# Patient Record
Sex: FEMALE | Race: WHITE | NOT HISPANIC OR LATINO | Employment: OTHER | URBAN - METROPOLITAN AREA
[De-identification: names, ages, dates, MRNs, and addresses within clinical notes are randomized per-mention and may not be internally consistent; named-entity substitution may affect disease eponyms.]

---

## 2023-09-15 ENCOUNTER — HOSPITAL ENCOUNTER (OUTPATIENT)
Dept: DATA CONVERSION | Facility: HOSPITAL | Age: 73
End: 2023-09-15

## 2023-11-02 ENCOUNTER — APPOINTMENT (OUTPATIENT)
Dept: PAIN MEDICINE | Facility: CLINIC | Age: 73
End: 2023-11-02
Payer: MEDICARE

## 2023-11-17 PROBLEM — H92.12 OTORRHEA, LEFT: Status: ACTIVE | Noted: 2023-11-17

## 2023-11-17 PROBLEM — H92.10 OTORRHEA: Status: ACTIVE | Noted: 2023-11-17

## 2023-11-17 PROBLEM — R42 EPISODIC LIGHTHEADEDNESS: Status: ACTIVE | Noted: 2023-08-22

## 2023-11-17 PROBLEM — M81.0 AGE-RELATED OSTEOPOROSIS WITHOUT CURRENT PATHOLOGICAL FRACTURE: Status: ACTIVE | Noted: 2020-04-29

## 2023-11-17 PROBLEM — L81.9 ABNORMAL PIGMENTATION OF SKIN: Status: ACTIVE | Noted: 2019-06-19

## 2023-11-17 PROBLEM — R53.83 FATIGUE: Status: ACTIVE | Noted: 2023-11-17

## 2023-11-17 PROBLEM — R42 LIGHTHEADEDNESS: Status: ACTIVE | Noted: 2023-11-17

## 2023-11-17 PROBLEM — M51.369 DEGENERATION OF INTERVERTEBRAL DISC OF LUMBAR REGION: Status: ACTIVE | Noted: 2023-11-17

## 2023-11-17 PROBLEM — Z86.39 HISTORY OF METABOLIC DISORDER: Status: ACTIVE | Noted: 2023-11-17

## 2023-11-17 PROBLEM — H61.21 EXCESSIVE CERUMEN IN EAR CANAL, RIGHT: Status: ACTIVE | Noted: 2023-08-22

## 2023-11-17 PROBLEM — D89.40 MAST CELL ACTIVATION, UNSPECIFIED (MULTI): Status: ACTIVE | Noted: 2023-01-01

## 2023-11-17 PROBLEM — H60.62 CHRONIC OTITIS EXTERNA OF LEFT EAR: Status: ACTIVE | Noted: 2023-08-22

## 2023-11-17 PROBLEM — H90.A32 MIXED CONDUCTIVE AND SENSORINEURAL HEARING LOSS, UNILATERAL, LEFT EAR WITH RESTRICTED HEARING ON THE CONTRALATERAL SIDE: Status: ACTIVE | Noted: 2023-08-22

## 2023-11-17 PROBLEM — G44.209 HEADACHE, TENSION-TYPE: Status: ACTIVE | Noted: 2023-11-17

## 2023-11-17 PROBLEM — E78.2 MIXED HYPERLIPIDEMIA: Status: ACTIVE | Noted: 2020-04-29

## 2023-11-17 PROBLEM — H61.22 IMPACTED CERUMEN OF LEFT EAR: Status: ACTIVE | Noted: 2023-11-17

## 2023-11-17 PROBLEM — Z86.79 HISTORY OF HYPERTENSION: Status: ACTIVE | Noted: 2023-11-17

## 2023-11-17 PROBLEM — M51.369 DDD (DEGENERATIVE DISC DISEASE), LUMBAR: Status: ACTIVE | Noted: 2023-11-17

## 2023-11-17 PROBLEM — G89.29 CHRONIC PAIN: Status: ACTIVE | Noted: 2023-11-17

## 2023-11-17 PROBLEM — M51.36 DDD (DEGENERATIVE DISC DISEASE), LUMBAR: Status: ACTIVE | Noted: 2023-11-17

## 2023-11-17 PROBLEM — L82.1 OTHER SEBORRHEIC KERATOSIS: Status: ACTIVE | Noted: 2019-06-19

## 2023-11-17 PROBLEM — M54.51 VERTEBROGENIC LOW BACK PAIN: Status: ACTIVE | Noted: 2023-11-17

## 2023-11-17 PROBLEM — M51.36 DEGENERATION OF INTERVERTEBRAL DISC OF LUMBAR REGION: Status: ACTIVE | Noted: 2023-11-17

## 2023-11-17 PROBLEM — E21.3: Status: ACTIVE | Noted: 2019-03-14

## 2023-11-17 PROBLEM — N20.0 KIDNEY CALCULUS: Status: ACTIVE | Noted: 2023-11-17

## 2023-11-17 PROBLEM — M96.1 FAILED BACK SURGICAL SYNDROME: Status: ACTIVE | Noted: 2023-11-17

## 2023-11-17 PROBLEM — H66.90 CHRONIC OTITIS MEDIA: Status: ACTIVE | Noted: 2023-11-17

## 2023-11-17 PROBLEM — H66.92 CHRONIC OTITIS MEDIA OF LEFT EAR: Status: ACTIVE | Noted: 2023-08-22

## 2023-11-17 PROBLEM — D18.01 HEMANGIOMA OF SKIN AND SUBCUTANEOUS TISSUE: Status: ACTIVE | Noted: 2019-06-19

## 2023-11-17 RX ORDER — IPRATROPIUM BROMIDE 21 UG/1
2 SPRAY, METERED NASAL 2 TIMES DAILY PRN
COMMUNITY
Start: 2023-04-21

## 2023-11-17 RX ORDER — ALENDRONATE SODIUM 70 MG/1
TABLET ORAL
COMMUNITY
Start: 2020-01-09 | End: 2023-11-28 | Stop reason: ALTCHOICE

## 2023-11-17 RX ORDER — FLUTICASONE PROPIONATE 50 MCG
1 SPRAY, SUSPENSION (ML) NASAL DAILY PRN
COMMUNITY

## 2023-11-17 RX ORDER — ACETAMINOPHEN 500 MG
1 TABLET ORAL
COMMUNITY

## 2023-11-17 RX ORDER — FAMOTIDINE 20 MG/1
20 TABLET, FILM COATED ORAL
COMMUNITY
Start: 2022-08-22 | End: 2023-11-28 | Stop reason: ALTCHOICE

## 2023-11-17 RX ORDER — ALBUTEROL SULFATE 90 UG/1
2 AEROSOL, METERED RESPIRATORY (INHALATION) EVERY 4 HOURS PRN
COMMUNITY
Start: 2018-11-14

## 2023-11-17 RX ORDER — CHOLECALCIFEROL (VITAMIN D3) 50 MCG
2000 TABLET ORAL
COMMUNITY

## 2023-11-17 RX ORDER — IBUPROFEN 200 MG
200 TABLET ORAL EVERY 8 HOURS PRN
COMMUNITY

## 2023-11-17 RX ORDER — LISINOPRIL 10 MG/1
TABLET ORAL
COMMUNITY
Start: 2015-06-02 | End: 2023-11-28 | Stop reason: ALTCHOICE

## 2023-11-17 RX ORDER — AMLODIPINE BESYLATE 5 MG/1
TABLET ORAL
COMMUNITY
Start: 2020-05-14 | End: 2023-11-28 | Stop reason: ALTCHOICE

## 2023-11-17 RX ORDER — CYCLOBENZAPRINE HCL 10 MG
10 TABLET ORAL 2 TIMES DAILY PRN
COMMUNITY

## 2023-11-17 RX ORDER — CETIRIZINE HYDROCHLORIDE 10 MG/1
10 TABLET ORAL DAILY PRN
COMMUNITY

## 2023-11-17 RX ORDER — CARVEDILOL 6.25 MG/1
6.25 TABLET ORAL 2 TIMES DAILY
COMMUNITY
Start: 2023-10-12

## 2023-11-17 RX ORDER — NYSTATIN AND TRIAMCINOLONE ACETONIDE 100000; 1 [USP'U]/G; MG/G
1 CREAM TOPICAL DAILY PRN
COMMUNITY
Start: 2022-02-02

## 2023-11-17 RX ORDER — NAPROXEN 500 MG/1
500 TABLET ORAL
COMMUNITY
Start: 2022-08-22 | End: 2023-11-28 | Stop reason: ALTCHOICE

## 2023-11-17 RX ORDER — LANOLIN ALCOHOL/MO/W.PET/CERES
1000 CREAM (GRAM) TOPICAL
COMMUNITY
Start: 2022-07-23

## 2023-11-17 RX ORDER — ROSUVASTATIN CALCIUM 5 MG/1
TABLET, COATED ORAL EVERY 24 HOURS
COMMUNITY
Start: 2020-01-09

## 2023-11-22 NOTE — PROGRESS NOTES
HEARING AID CHECK    RIGHT: Phonak sEtereo M70-RT SN: [id number]  : 2 x M  Wax Guard/Dome: Cerushield/small closed  LEFT: Phonak Audeo M70-RT SN: [id number]  : 1 x P  Wax Guard/Dome: Cerushield/ small closed    Repair Warranty: out of warranty 4/27/2023  L&D Warranty: out of warranty 4/27/2023    TV CONNECTOR: SN: [id number]    Warranty: out of warranty 4/27/2021    Merline Chowdhury was seen for a hearing aid check following ENT appt.  Listening check revealed good working function of devices.  Hearing aids were cleaned and domes and wax guards changed.  Updated patient's devices to most recent audiometric evaluation and changed left device to a power  from a standard . Patient satisfied.    $25 HAC not under warranty though obtained through our office    Ruth Conde, CCC-A    Appt time: 10:00 - 10:30 AM

## 2023-11-25 NOTE — PROGRESS NOTES
"Subjective   Patient ID: Merline Chowdhury \"Rose" is a 73 y.o. female who presents for No chief complaint on file..  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  She initially presented with a history of a perforation of the left tympanic membrane along with ear canal symptoms of itching and epithelial scaling.  She was treated with Lotrisone ointment and has been keeping water out of her ears when possible.  Her audiogram from May of this year on the left-hand side revealed evidence for a moderately severe to severe mixed hearing loss with 84% discrimination ability at 95 dB.  And on the right-hand side there was a moderate hearing loss up through 4000 Hz moderately severe in the upper frequencies of sound with 96% discrimination at 75 dB.  Previous records and hearing test were reviewed.  Review of Systems    Objective   Physical Exam  EXAMINATION:    GENERAL DISHA.EARANCE: Alert, in no acute distress, normal pitch and clarity of voice, well-developed and nourished, cooperative.    HEAD/FACE: Normocephalic, atraumatic, normal facial movements and strength, no no tenderness to palpation, no lesions noted.    SKIN: Normal turgor, no raised or ulcerative lesions, warm and dry to palpation.    EYES: Extraocular motions intact, no nystagmus noted, pupils equal and reactive to light and accommodation, no conjunctivitis.     EARS:    On the left I cleaned the wax from the entrance of the ear canal. She has a dry posterior central perforation with a sclerotic anterior tympanic membrane. On the right she had a moderate amount of wax which was carefully cleaned from the ear canal. The right tympanic membrane was intact.  See procedure note     NOSE: No external skin lesions are noted, nares are patent, septum is intact, sinuses are nontender to palpation " "bilaterally, no intranasal lesions or inflammation is noted, nasal valve is normal.    OROPHARYNX/ORAL CAVITY: Oropharynx is not inflamed and is without lesions, mucosa of the oral cavity is intact and without lesions, tongue is midline and mobile, no acute dental disease is noted, TMJs are mobile    LUNG-- NO wheezing or rhonchi normal respiratory effort    HEART-- No venous congestion,  rate and rhythm regular,    NECK: No lymphadenopathy is palpated, carotid pulses are intact, neck is supple with full range of motion, no thyroid abnormalities are noted, trachea is midline, no neck masses are palpated.    LYMPHATICS: No cervical adenopathy or supraclavicular adenopathy is palpated.    NEUROLOGIC/PSYCH; alert and oriented, cranial nerves are grossly intact, gait is without falling, no motor deficits are noted.         Patient ID: Merline Chowdhury \"Rose" is a 73 y.o. female.    Ear cerumen removal    Date/Time: 11/28/2023 8:56 AM    Performed by: Matthew Cody DMD, MD  Authorized by: Matthew Cody DMD, MD    Consent:     Consent obtained:  Verbal    Consent given by:  Patient    Risks discussed:  Pain and incomplete removal    Alternatives discussed:  No treatment and alternative treatment  Universal protocol:     Procedure explained and questions answered to patient or proxy's satisfaction: yes      Imaging studies available: no      Required blood products, implants, devices, and special equipment available: no      Patient identity confirmed:  Verbally with patient  Procedure details:     Location:  L ear and R ear    Procedure type: curette      Procedure type comment:  Or suction    Procedure outcomes: cerumen removed    Post-procedure details:     Inspection:  No bleeding and ear canal clear    Hearing quality:  Improved    Procedure completion:  Tolerated  Comments:      Endoscopic evaluation of the right ear reveals significant dryness to the skin with dry ceruminous debris.  The canal sound was " not infected.  The tympanic Mem was intact with no signs of inflammation.  On the left-hand side there was dry ceruminous debris around the outer aspect of the ear canal which was removed the tympanic membrane had a posterior perforation of the tympanic membrane with tympanosclerotic changes to the remainder of the TM.  No otorrhea was noted    Assessment/Plan   Problem List Items Addressed This Visit             ICD-10-CM    Chronic otitis media of left ear - Primary H66.92    Hearing loss H91.90     Other Visit Diagnoses         Codes    Perforation of left tympanic membrane     H72.92    Bilateral impacted cerumen     H61.23        I discussed the clinical finds with the patient.  From the standpoint of her ears there was no signs of otorrhea or infant infection on the left-hand side.  There was dry ceruminous debris on both sides right more than left.  This was removed with a wax loop microscopically.  Her hearing test is up-to-date and this was reviewed with her.  She has a significant mixed loss on the left side and moderate loss on the right.  She is going to see our audiologist for check on her hearing aids during this visit.  She should be seen in 6 months for recheck with an audiogram at that time.  She can use lubricating oil drops on the right side only not on the left.  She can use external hand cream around the ear canal opening to help soften the skin.

## 2023-11-28 ENCOUNTER — CLINICAL SUPPORT (OUTPATIENT)
Dept: AUDIOLOGY | Facility: CLINIC | Age: 73
End: 2023-11-28
Payer: MEDICARE

## 2023-11-28 ENCOUNTER — OFFICE VISIT (OUTPATIENT)
Dept: OTOLARYNGOLOGY | Facility: CLINIC | Age: 73
End: 2023-11-28
Payer: MEDICARE

## 2023-11-28 VITALS
HEART RATE: 76 BPM | SYSTOLIC BLOOD PRESSURE: 153 MMHG | BODY MASS INDEX: 31.61 KG/M2 | DIASTOLIC BLOOD PRESSURE: 89 MMHG | HEIGHT: 60 IN | WEIGHT: 161 LBS

## 2023-11-28 DIAGNOSIS — H90.A32 MIXED CONDUCTIVE AND SENSORINEURAL HEARING LOSS OF LEFT EAR WITH RESTRICTED HEARING OF RIGHT EAR: ICD-10-CM

## 2023-11-28 DIAGNOSIS — H66.92 CHRONIC OTITIS MEDIA OF LEFT EAR: Primary | ICD-10-CM

## 2023-11-28 DIAGNOSIS — H90.A21 SENSORINEURAL HEARING LOSS (SNHL) OF RIGHT EAR WITH RESTRICTED HEARING OF LEFT EAR: ICD-10-CM

## 2023-11-28 DIAGNOSIS — H72.92 PERFORATION OF LEFT TYMPANIC MEMBRANE: ICD-10-CM

## 2023-11-28 DIAGNOSIS — H61.23 BILATERAL IMPACTED CERUMEN: ICD-10-CM

## 2023-11-28 PROCEDURE — 1036F TOBACCO NON-USER: CPT | Performed by: OTOLARYNGOLOGY

## 2023-11-28 PROCEDURE — 3077F SYST BP >= 140 MM HG: CPT | Performed by: OTOLARYNGOLOGY

## 2023-11-28 PROCEDURE — 99214 OFFICE O/P EST MOD 30 MIN: CPT | Performed by: OTOLARYNGOLOGY

## 2023-11-28 PROCEDURE — 69210 REMOVE IMPACTED EAR WAX UNI: CPT | Performed by: OTOLARYNGOLOGY

## 2023-11-28 PROCEDURE — 1159F MED LIST DOCD IN RCRD: CPT | Performed by: OTOLARYNGOLOGY

## 2023-11-28 PROCEDURE — 3079F DIAST BP 80-89 MM HG: CPT | Performed by: OTOLARYNGOLOGY

## 2023-12-01 ENCOUNTER — TELEPHONE (OUTPATIENT)
Dept: AUDIOLOGY | Facility: CLINIC | Age: 73
End: 2023-12-01
Payer: MEDICARE

## 2023-12-04 NOTE — TELEPHONE ENCOUNTER
Patient called and reported that her left hearing aid had started squealing.  Called patient back, but no answer.  Left message asking patient to switch left dome back to size medium to see if this rectified the problem and to return call should feedback continue.

## 2023-12-26 NOTE — PROGRESS NOTES
HEARING AID CHECK    RIGHT: Phonak Estereo M70-RT SN: [id number]  : 2 x M  Wax Guard/Dome: Cerushield/small closed  LEFT: Phonak Estereo M70-RT SN: [id number]  : 1 x P  Wax Guard/Dome: Cerushield/ small closed     Repair Warranty: out of warranty 4/27/2023  L&D Warranty: out of warranty 4/27/2023     TV CONNECTOR: SN: [id number]     Warranty: out of warranty 4/27/2021    Merline Chowdhury was seen for a hearing aid check following recent reprogramming of devices.  Patient reported that her left device did not seem to seat fully into the ear which caused a significant amount of squealing.  Practiced proper insertion of device and pulling ear back to open up EAC.  Additionally reprogrammed devices, moving sound recover towards comfort and reducing compression ratios below 2.  Took down LF of left ear approximately 2 clicks.  Patient will try these changes, but will schedule an appointment for March in case she needs further changes.  If all is going well, she will cancel the appointment and return for her regularly scheduled appointment in June.  Patient satisfied.    N/c as modifications to recent changes    Ruth Conde, CCC-A    Appt time: 9:45 - 10:15 AM

## 2023-12-27 ENCOUNTER — CLINICAL SUPPORT (OUTPATIENT)
Dept: AUDIOLOGY | Facility: CLINIC | Age: 73
End: 2023-12-27
Payer: MEDICARE

## 2023-12-27 DIAGNOSIS — H90.A32 MIXED CONDUCTIVE AND SENSORINEURAL HEARING LOSS, UNILATERAL, LEFT EAR WITH RESTRICTED HEARING ON THE CONTRALATERAL SIDE: Primary | ICD-10-CM

## 2023-12-27 DIAGNOSIS — H90.A21 SENSORINEURAL HEARING LOSS (SNHL) OF RIGHT EAR WITH RESTRICTED HEARING OF LEFT EAR: ICD-10-CM

## 2023-12-27 PROCEDURE — HRANC PR HEARING AID NO CHARGE: Performed by: AUDIOLOGIST

## 2024-02-12 NOTE — PROGRESS NOTES
HEARING AID CHECK     RIGHT: Marjorie Pinedao M70-RT SN: [id number]  : 1 x M  Wax Guard/Dome: Cerushield/small closed  LEFT: Phonak Estereo M70-RT SN: [id number]  : 1 x M  Wax Guard/Dome: Cerushield/ small closed     Repair Warranty: out of warranty 4/27/2023  L&D Warranty: out of warranty 4/27/2023     TV CONNECTOR: SN: [id number]     Warranty: out of warranty 4/27/2021          Merline Chowdhury was seen for a hearing aid check as her left device has come out of her ear continuously.  Patient reported she would hold her ear up and back and seat it all the way and it still wouldn't retain.  was changed back to 1 x M at patient request, though discussed that we are at the limitations of the equipment and should her hearing get any worse, we would need to switch to a power dome or likely a custom mold.  Patient understood.  Patient also asked that her right  was changed to a 1 x M which was done as a courtesy.  Listening check revealed good working function of devices.  Patient will try these changes, but keep her appointment for March in case she needs further changes.  If all is going well, she will cancel the appointment and return for her regularly scheduled appointment in June.  Patient satisfied.Patient satisfied.    $25 HAC without programming (, H90.A32, H90.A21)    Ruth Conde, CCC-A    Appt time: 11:00 - 11:30 AM

## 2024-02-15 ENCOUNTER — CLINICAL SUPPORT (OUTPATIENT)
Dept: AUDIOLOGY | Facility: CLINIC | Age: 74
End: 2024-02-15

## 2024-02-15 DIAGNOSIS — H90.A21 SENSORINEURAL HEARING LOSS (SNHL) OF RIGHT EAR WITH RESTRICTED HEARING OF LEFT EAR: Primary | ICD-10-CM

## 2024-02-15 DIAGNOSIS — H90.A32 MIXED CONDUCTIVE AND SENSORINEURAL HEARING LOSS, UNILATERAL, LEFT EAR WITH RESTRICTED HEARING ON THE CONTRALATERAL SIDE: ICD-10-CM

## 2024-03-12 ENCOUNTER — APPOINTMENT (OUTPATIENT)
Dept: PAIN MEDICINE | Facility: CLINIC | Age: 74
End: 2024-03-12
Payer: MEDICARE

## 2024-03-14 ENCOUNTER — OFFICE VISIT (OUTPATIENT)
Dept: PAIN MEDICINE | Facility: CLINIC | Age: 74
End: 2024-03-14
Payer: MEDICARE

## 2024-03-14 VITALS
DIASTOLIC BLOOD PRESSURE: 91 MMHG | SYSTOLIC BLOOD PRESSURE: 161 MMHG | BODY MASS INDEX: 31.61 KG/M2 | HEIGHT: 60 IN | OXYGEN SATURATION: 100 % | RESPIRATION RATE: 16 BRPM | HEART RATE: 71 BPM | WEIGHT: 161 LBS

## 2024-03-14 DIAGNOSIS — G89.29 OTHER CHRONIC PAIN: ICD-10-CM

## 2024-03-14 DIAGNOSIS — M96.1 POSTLAMINECTOMY SYNDROME, LUMBAR REGION: Primary | ICD-10-CM

## 2024-03-14 DIAGNOSIS — M54.51 VERTEBROGENIC LOW BACK PAIN: ICD-10-CM

## 2024-03-14 DIAGNOSIS — M47.817 LUMBOSACRAL SPONDYLOSIS WITHOUT MYELOPATHY: ICD-10-CM

## 2024-03-14 PROCEDURE — 1036F TOBACCO NON-USER: CPT | Performed by: ANESTHESIOLOGY

## 2024-03-14 PROCEDURE — 1160F RVW MEDS BY RX/DR IN RCRD: CPT | Performed by: ANESTHESIOLOGY

## 2024-03-14 PROCEDURE — 1159F MED LIST DOCD IN RCRD: CPT | Performed by: ANESTHESIOLOGY

## 2024-03-14 PROCEDURE — 99214 OFFICE O/P EST MOD 30 MIN: CPT | Performed by: ANESTHESIOLOGY

## 2024-03-14 PROCEDURE — 1125F AMNT PAIN NOTED PAIN PRSNT: CPT | Performed by: ANESTHESIOLOGY

## 2024-03-14 PROCEDURE — 3080F DIAST BP >= 90 MM HG: CPT | Performed by: ANESTHESIOLOGY

## 2024-03-14 PROCEDURE — 3077F SYST BP >= 140 MM HG: CPT | Performed by: ANESTHESIOLOGY

## 2024-03-14 ASSESSMENT — ENCOUNTER SYMPTOMS
EYES NEGATIVE: 1
GASTROINTESTINAL NEGATIVE: 1
ENDOCRINE NEGATIVE: 1
ALLERGIC/IMMUNOLOGIC NEGATIVE: 1
NEUROLOGICAL NEGATIVE: 1
BACK PAIN: 1
CONSTITUTIONAL NEGATIVE: 1
PSYCHIATRIC NEGATIVE: 1
RESPIRATORY NEGATIVE: 1
CARDIOVASCULAR NEGATIVE: 1
HEMATOLOGIC/LYMPHATIC NEGATIVE: 1

## 2024-03-14 ASSESSMENT — PAIN DESCRIPTION - DESCRIPTORS: DESCRIPTORS: ACHING

## 2024-03-14 ASSESSMENT — PAIN - FUNCTIONAL ASSESSMENT: PAIN_FUNCTIONAL_ASSESSMENT: 0-10

## 2024-03-14 ASSESSMENT — PAIN SCALES - GENERAL: PAINLEVEL_OUTOF10: 2

## 2024-03-14 ASSESSMENT — PATIENT HEALTH QUESTIONNAIRE - PHQ9
2. FEELING DOWN, DEPRESSED OR HOPELESS: NOT AT ALL
SUM OF ALL RESPONSES TO PHQ9 QUESTIONS 1 AND 2: 0
1. LITTLE INTEREST OR PLEASURE IN DOING THINGS: NOT AT ALL

## 2024-03-14 ASSESSMENT — LIFESTYLE VARIABLES: TOTAL SCORE: 0

## 2024-03-14 NOTE — PROGRESS NOTES
"Subjective   Patient ID: Merline Chowdhury \"Nata\" is a 73 y.o. female who presents for Back Pain.  Back Pain  Patient here today for follow up today.  She rates her pain as a 3-8/10 pending her activity.  Since her last visit she did an extensive amount of PT which improved her over all functional ability.  If she over does her activies she will be hurting.  Her pain is more in the back and then she will get the cramping in the legs.  She will get them when she is laying in flat in bed.    She has back pain with sitting, leaning forward, picking up items, and standing in one spot.  She is only able to sit for about 1 hour.  Yard work is very hard for her as well.    She would liek to discuss what options she had.  She has had previous low back surgery with Dr. Tapia in the past and does not have any additional surgical options.     Review of Systems   Constitutional: Negative.    HENT: Negative.     Eyes: Negative.    Respiratory: Negative.     Cardiovascular: Negative.    Gastrointestinal: Negative.    Endocrine: Negative.    Genitourinary: Negative.    Musculoskeletal:  Positive for back pain.   Skin: Negative.    Allergic/Immunologic: Negative.    Neurological: Negative.    Hematological: Negative.    Psychiatric/Behavioral: Negative.         Objective   Physical Exam  Vitals and nursing note reviewed.   Constitutional:       Appearance: Normal appearance.   HENT:      Head: Normocephalic and atraumatic.      Right Ear: Ear canal and external ear normal.      Left Ear: Ear canal and external ear normal.      Nose: Nose normal.      Mouth/Throat:      Mouth: Mucous membranes are moist.      Pharynx: Oropharynx is clear.   Eyes:      Conjunctiva/sclera: Conjunctivae normal.      Pupils: Pupils are equal, round, and reactive to light.   Cardiovascular:      Rate and Rhythm: Normal rate.   Pulmonary:      Effort: Pulmonary effort is normal. No respiratory distress.   Musculoskeletal:      Cervical back: Normal " range of motion and neck supple.      Lumbar back: Tenderness present. No spasms. Decreased range of motion. Scoliosis present.   Skin:     General: Skin is warm and dry.   Neurological:      Mental Status: She is alert and oriented to person, place, and time.      Sensory: Sensation is intact.      Motor: Motor function is intact.      Coordination: Coordination is intact.      Gait: Gait is intact.      Deep Tendon Reflexes:      Reflex Scores:       Patellar reflexes are 2+ on the right side and 2+ on the left side.  Psychiatric:         Mood and Affect: Mood normal.         Thought Content: Thought content normal.       Assessment/Plan   Problem List Items Addressed This Visit             ICD-10-CM       Musculoskeletal and Injuries    Vertebrogenic low back pain M54.51       Neuro    Chronic pain G89.29     Other Visit Diagnoses         Codes    Postlaminectomy syndrome, lumbar region    -  Primary M96.1    Lumbosacral spondylosis without myelopathy     M47.817          I nice discussion with the patient today our plan will be as follows.    Radiology: Patient with severe DDD at L4/5 and L5/S1 with modic end plate changes.     Physically:  Continue with home exercise program and chair yoga. Patient completed 10 weeks of pt over the fall.      Psychologically:  No issues.     Medication: No changes     Duration:  > 1 yea    Intervention:  Patient is a candidate for Intracept procedure at L4, L5 and S1.  Risks, benefit, and alternatives of the procedure were discussed with the patient.  Oswestry score has been compelted and recorded.             Rafi Leonard MD 03/14/24 2:30 PM

## 2024-03-25 NOTE — PROGRESS NOTES
"HEARING AID CHECK    RIGHT: Phonak Estereo M70-RT SN: [id number]  : 1 x M  Wax Guard/Dome: Cerushield/small closed  LEFT: Phonak Estereo M70-RT SN: [id number]  :  2 x P Wax Guard/Dome: Cerushield/  cap     Repair Warranty: out of warranty 2023  L&D Warranty: out of warranty 2023     TV CONNECTOR: SN: [id number]     Warranty: out of warranty 2021    Merline Chowdhury was seen for a hearing aid check due to concerns for reduced hearing sensitivity in the left ear.  Please recall, patient has a known left tympanic membrane perforation.  She reported that her left hearing aid was working well when held up to her right ear, which gave her the suspicion that her left hearing sensitivity had significantly dropped. Listening check revealed fair working function of left device.  Discussed again with patient that as she would not move to the power  due to concerns for fit, the medium  would only have fair sound quality due to intensity limitations.  Due to patient concerns limited evaluation of hearing sensitivity was performed for evaluation of changes in hearing.  Following discussion, patient was in agreement to try power  in the left ear again.    $25 hearing aid check (, H90.3)    LIMITED AUDIOMETRIC EVALUATION      Name:  Merline Chowdhury \"Nata\"  :  1950  Age:  73 y.o.  Date of Evaluation:  24   Referring Provider:  SELF    History:  Ms. Chowdhury was seen today  for a limited evaluation of hearing.  Patient reported concerns for decreased hearing sensitivity in the left ear.    See audiometric evaluation at end of this report or scanned under media tab    OTOSCOPY:       Right Ear: Minimal non-occluding cerumen       Left Ear: Clear canal with perforation visualized    PURE TONE AIR CONDUCTION EVALUATION (Phones):       Right Ear: Borderline moderate to moderately severe sloping to Severe          Left Ear: Profound rising to severe    NOTE: " Pure tones within test retest reliability of 5/18/23 evaluation    Test technique:  Standard Audiometry  Reliability:   good    WORD RECOGNITION:            Left Ear:  very poor (48%) at elevated presentation level            Left Ear Bone Conduction:  very poor (20%) at elevated presentation level    NOTE: WRS may be better than indicated on today's evaluation due to reduced sensation level and equipment intensity limitations    DISCUSSION:   Discussed results and recommendations with patient.  Questions were addressed and the patient was encouraged to contact our department should concerns arise.    RECOMMENDATIONS:  -Recommend patient continue scheduled appointment with Dr. Cody for discussion regarding medical management of left tympanic membrane perforation as opposed to utilizing power  consistently  -Recommend patient return for audiometric evaluation as medically indicated or should concerns for changes in hearing sensitivity arise.  -Recommend patient return for hearing aid check in ~6 months or as needed.    Ruth Conde, CCC-A     Appt: 10:00 - 10:30 AM

## 2024-03-28 ENCOUNTER — CLINICAL SUPPORT (OUTPATIENT)
Dept: AUDIOLOGY | Facility: CLINIC | Age: 74
End: 2024-03-28

## 2024-03-28 DIAGNOSIS — H90.A21 SENSORINEURAL HEARING LOSS (SNHL) OF RIGHT EAR WITH RESTRICTED HEARING OF LEFT EAR: ICD-10-CM

## 2024-03-28 DIAGNOSIS — H90.A32 MIXED CONDUCTIVE AND SENSORINEURAL HEARING LOSS, UNILATERAL, LEFT EAR WITH RESTRICTED HEARING ON THE CONTRALATERAL SIDE: Primary | ICD-10-CM

## 2024-03-28 PROCEDURE — 92552 PURE TONE AUDIOMETRY AIR: CPT | Performed by: AUDIOLOGIST

## 2024-04-09 ENCOUNTER — PREP FOR PROCEDURE (OUTPATIENT)
Dept: PAIN MEDICINE | Facility: CLINIC | Age: 74
End: 2024-04-09
Payer: MEDICARE

## 2024-04-09 DIAGNOSIS — M54.51 VERTEBROGENIC LOW BACK PAIN: Primary | ICD-10-CM

## 2024-04-09 RX ORDER — SODIUM CHLORIDE, SODIUM LACTATE, POTASSIUM CHLORIDE, CALCIUM CHLORIDE 600; 310; 30; 20 MG/100ML; MG/100ML; MG/100ML; MG/100ML
100 INJECTION, SOLUTION INTRAVENOUS CONTINUOUS
Status: CANCELLED | OUTPATIENT
Start: 2024-06-07

## 2024-04-23 ENCOUNTER — APPOINTMENT (OUTPATIENT)
Dept: OTOLARYNGOLOGY | Facility: CLINIC | Age: 74
End: 2024-04-23
Payer: MEDICARE

## 2024-05-24 ENCOUNTER — PRE-ADMISSION TESTING (OUTPATIENT)
Dept: PREADMISSION TESTING | Facility: HOSPITAL | Age: 74
End: 2024-05-24
Payer: MEDICARE

## 2024-05-24 VITALS
TEMPERATURE: 96.8 F | OXYGEN SATURATION: 100 % | RESPIRATION RATE: 18 BRPM | SYSTOLIC BLOOD PRESSURE: 155 MMHG | HEIGHT: 60 IN | BODY MASS INDEX: 32.42 KG/M2 | HEART RATE: 77 BPM | WEIGHT: 165.12 LBS | DIASTOLIC BLOOD PRESSURE: 87 MMHG

## 2024-05-24 PROCEDURE — 99203 OFFICE O/P NEW LOW 30 MIN: CPT

## 2024-05-24 RX ORDER — ZINC GLUCONATE 50 MG
50 TABLET ORAL DAILY
COMMUNITY

## 2024-05-24 RX ORDER — ZINC GLUCONATE 50 MG
500 TABLET ORAL DAILY
COMMUNITY

## 2024-05-24 ASSESSMENT — ENCOUNTER SYMPTOMS
CARDIOVASCULAR NEGATIVE: 1
CONSTITUTIONAL NEGATIVE: 1
PSYCHIATRIC NEGATIVE: 1
NEUROLOGICAL NEGATIVE: 1
BACK PAIN: 1
ENDOCRINE NEGATIVE: 1
ALLERGIC/IMMUNOLOGIC NEGATIVE: 1
RESPIRATORY NEGATIVE: 1
HEMATOLOGIC/LYMPHATIC NEGATIVE: 1
EYES NEGATIVE: 1
GASTROINTESTINAL NEGATIVE: 1

## 2024-05-24 ASSESSMENT — DUKE ACTIVITY SCORE INDEX (DASI)
CAN YOU DO MODERATE WORK AROUND THE HOUSE LIKE VACUUMING, SWEEPING FLOORS OR CARRYING GROCERIES: YES
CAN YOU PARTICIPATE IN MODERATE RECREATIONAL ACTIVITIES LIKE GOLF, BOWLING, DANCING, DOUBLES TENNIS OR THROWING A BASEBALL OR FOOTBALL: NO
CAN YOU HAVE SEXUAL RELATIONS: NO
CAN YOU DO LIGHT WORK AROUND THE HOUSE LIKE DUSTING OR WASHING DISHES: YES
CAN YOU PARTICIPATE IN STRENOUS SPORTS LIKE SWIMMING, SINGLES TENNIS, FOOTBALL, BASKETBALL, OR SKIING: NO
CAN YOU WALK INDOORS, SUCH AS AROUND YOUR HOUSE: YES
TOTAL_SCORE: 18.95
CAN YOU DO HEAVY WORK AROUND THE HOUSE LIKE SCRUBBING FLOORS OR LIFTING AND MOVING HEAVY FURNITURE: NO
DASI METS SCORE: 5.1
CAN YOU CLIMB A FLIGHT OF STAIRS OR WALK UP A HILL: YES
CAN YOU RUN A SHORT DISTANCE: NO
CAN YOU TAKE CARE OF YOURSELF (EAT, DRESS, BATHE, OR USE TOILET): YES
CAN YOU WALK A BLOCK OR TWO ON LEVEL GROUND: YES
CAN YOU DO YARD WORK LIKE RAKING LEAVES, WEEDING OR PUSHING A MOWER: NO

## 2024-05-24 ASSESSMENT — PAIN SCALES - GENERAL: PAINLEVEL_OUTOF10: 0 - NO PAIN

## 2024-05-24 ASSESSMENT — PAIN - FUNCTIONAL ASSESSMENT: PAIN_FUNCTIONAL_ASSESSMENT: 0-10

## 2024-05-24 NOTE — H&P (VIEW-ONLY)
"CPM/PAT Evaluation       Name: Merline Chowdhury (Merline Chowdhury \"Nata\")  /Age: 1950/73 y.o.     In-Person       Chief Complaint: Lower Back Pain    HPI  Patient is 73-year-old female presenting for low back pain. She has a past medical history of hypertensive disorder, prediabetes, and hyperparathyroidism. She reports pain across her lower back radiating into her left buttock beginning approximately 3 to 4 years ago.  Patient reports that this pain worsens when she stays in 1 position for a long period of time.  Particularly if she stands in one position for too long she states that her legs \"locked up\".  She reports intermittent numbness and tingling to her lower extremities.  Denies any loss of bowel or bladder function.  This pain is beginning to interfere with the patient's activities of daily living.  She denies chest pain, pressure or palpitations.  Denies shortness of breath.  Denies blood in her urine or stool. She has attempted pain management with physical therapy, but this is no longer providing relief. She also takes Flexeril and ibuprofen as needed for muscle spasms which provides little relief. Patient was evaluated by Aisha and was scheduled for a radiofrequency nerve ablation on 2024.     Past Medical History:   Diagnosis Date    Delayed emergence from general anesthesia     Other allergy status, other than to drugs and biological substances     Environmental allergies    Personal history of other diseases of the circulatory system     History of hypertension    Personal history of other endocrine, nutritional and metabolic disease     History of high cholesterol       Past Surgical History:   Procedure Laterality Date    APPENDECTOMY  08/10/2015    Appendectomy    BACK SURGERY  2017    Back Surgery    HAND SURGERY  08/10/2015    Hand Surgery                                                                                                                                    "                       HYSTERECTOMY  08/10/2015    Hysterectomy    TONSILLECTOMY  08/10/2015    Tonsillectomy With Adenoidectomy       Patient  has no history on file for sexual activity.    No family history on file.    Allergies   Allergen Reactions    Alendronate Other and Unknown     Muscle pain    Other reaction(s): Other (See Comments)   Muscle pain   Took it for 4 weeks. Muscle stiffness - back, thighs. Symptoms resolved after stopping it.    Amlodipine Swelling and Unknown     5 mg: Edema.     5 mg: Edema.    Annatto Hives and Itching    Cephalexin Anaphylaxis and Unknown     Throat closing as young adult.    Erythromycin Hives, Rash and Unknown     Can take z-divya.    Hydrochlorothiazide Itching and Unknown     Severe muscle cramps.    Lisinopril Itching and Unknown     Cough.    Losartan Hives and Itching     Losartan 25 mg & 50 mg: Hives.     Losartan 25 mg & 50 mg: Hives.    Tetracyclines Hives, Nausea Only and Unknown     Can take doxycycline.     Can take doxycycline.    Tizanidine Palpitations and Unknown     Dry mouth, Headache.     Dry mouth, Headache.    Alendronate Sodium Other     Took it for 4 weeks. Muscle stiffness - back, thighs. Symptoms resolved after stopping it.    Atorvastatin Other    Ciprofloxacin Nausea Only    House Dust Mite Unknown    Iodine Other     Topical Iodine: Rash.  BLISTERS    Topical Iodine: Rash.    Mold Unknown    Pork Extract Other    Pork/Porcine Containing Products Unknown    Simvastatin Other     Muscle aches    Sulfa (Sulfonamide Antibiotics) Other and Unknown    Latex Rash and Unknown    Moxifloxacin Nausea Only    Olopatadine Itching, Rash and Swelling       Current Outpatient Medications:     albuterol 90 mcg/actuation inhaler, Inhale 2 puffs every 4 hours if needed for wheezing or shortness of breath., Disp: , Rfl:     calcium carbonate-vitamin D3 600 mg-20 mcg (800 unit) tablet, Take 1 tablet by mouth., Disp: , Rfl:     carvedilol (Coreg) 6.25 mg tablet, Take 1  tablet (6.25 mg) by mouth twice a day., Disp: , Rfl:     cetirizine (ZyrTEC) 10 mg tablet, Take 1 tablet (10 mg) by mouth once daily as needed for allergies or rhinitis., Disp: , Rfl:     cholecalciferol (Vitamin D-3) 50 MCG (2000 UT) tablet, Take 1 tablet (2,000 Units) by mouth once daily., Disp: , Rfl:     cyanocobalamin (Vitamin B-12) 1,000 mcg tablet, Take 1 tablet (1,000 mcg) by mouth once daily., Disp: , Rfl:     cyclobenzaprine (Flexeril) 10 mg tablet, Take 1 tablet (10 mg) by mouth 2 times a day as needed for muscle spasms., Disp: , Rfl:     fluticasone (Flonase) 50 mcg/actuation nasal spray, Administer 1 spray into each nostril once daily as needed for allergies., Disp: , Rfl:     ibuprofen 200 mg tablet, Take 1 tablet (200 mg) by mouth every 8 hours if needed for mild pain (1 - 3) or moderate pain (4 - 6)., Disp: , Rfl:     ipratropium (Atrovent) 21 mcg (0.03 %) nasal spray, 2 sprays 2 times a day as needed., Disp: , Rfl:     magnesium oxide 500 mg capsule, Take 1 capsule (500 mg) by mouth once daily., Disp: , Rfl:     nystatin-triamcinolone (Mycolog II) cream, Apply 1 Application topically once daily as needed (RASH). Apply to affected area, Disp: , Rfl:     rosuvastatin (Crestor) 5 mg tablet, once every 24 hours., Disp: , Rfl:     zinc gluconate 50 mg tablet, Take 1 tablet (50 mg of elemental zinc) by mouth once daily., Disp: , Rfl:     Review of Systems   Constitutional: Negative.   HENT: Negative.     Eyes: Negative.    Cardiovascular: Negative.    Respiratory: Negative.     Endocrine: Negative.    Hematologic/Lymphatic: Negative.    Skin: Negative.    Musculoskeletal:  Positive for back pain.   Gastrointestinal: Negative.    Genitourinary: Negative.    Neurological: Negative.    Psychiatric/Behavioral: Negative.     Allergic/Immunologic: Negative.        Physical Exam  Cardiovascular:      Rate and Rhythm: Normal rate and regular rhythm.      Heart sounds: No murmur heard.     No friction rub. No  gallop.   Pulmonary:      Effort: Pulmonary effort is normal.      Breath sounds: Normal breath sounds. No wheezing, rhonchi or rales.   Abdominal:      General: Bowel sounds are normal.   Musculoskeletal:      Right lower leg: No edema.      Left lower leg: No edema.   Skin:     General: Skin is warm and dry.      Capillary Refill: Capillary refill takes less than 2 seconds.   Neurological:      Mental Status: She is alert and oriented to person, place, and time.   Psychiatric:         Mood and Affect: Mood normal.         Behavior: Behavior normal.          PAT AIRWAY:   Airway:     Mallampati::  II    Neck ROM::  Full      Vitals  /87   Pulse 77   Temp 36 °C (96.8 °F) (Temporal)   Resp 18   Ht 1.524 m (5')   Wt 74.9 kg (165 lb 2 oz)   SpO2 100%   BMI 32.25 kg/m²     STOP-BAN  CHADS 2 score: 2  DASI score: 18.95  METS score: 5.1  Revised cardiac risk index: 0  ASA: II    Assessment and Plan:   Lower Back Pain: Plan for radiofrequency ablation with Dr. Leonard on 2024.   Hypertensive Disorder: Managed with carvedilol BID  Hyperparathyroidism: Stable, monitored on yearly labs by PCP  Hyperlipidemia: Managed on Crestor      24 at 2:31 PM - IBAN Best-CNP

## 2024-05-24 NOTE — PREPROCEDURE INSTRUCTIONS
Medication List            Accurate as of May 24, 2024  1:17 PM. Always use your most recent med list.                albuterol 90 mcg/actuation inhaler  Notes to patient: PATIENT NO LONGER HAS      calcium carbonate-vitamin D3 600 mg-20 mcg (800 unit) tablet  Medication Adjustments for Surgery: Stop 7 days before surgery     carvedilol 6.25 mg tablet  Commonly known as: Coreg  Medication Adjustments for Surgery: Take morning of surgery with sip of water, no other fluids     cetirizine 10 mg tablet  Commonly known as: ZyrTEC  Medication Adjustments for Surgery: Continue until night before surgery     cholecalciferol 50 MCG (2000 UT) tablet  Commonly known as: Vitamin D-3  Medication Adjustments for Surgery: Stop 7 days before surgery     cyanocobalamin 1,000 mcg tablet  Commonly known as: Vitamin B-12  Medication Adjustments for Surgery: Stop 7 days before surgery     cyclobenzaprine 10 mg tablet  Commonly known as: Flexeril  Medication Adjustments for Surgery: Continue until night before surgery     fluticasone 50 mcg/actuation nasal spray  Commonly known as: Flonase  Notes to patient: MAY USE MORNING IF NEEDED     ibuprofen 200 mg tablet  STOP 7 DAYS BEFORE PROCEDURE   ipratropium 21 mcg (0.03 %) nasal spray  Commonly known as: Atrovent  Notes to patient: PATIENT NOT USING     magnesium oxide 500 mg capsule  Medication Adjustments for Surgery: Stop 7 days before surgery     nystatin-triamcinolone cream  Commonly known as: Mycolog II  Medication Adjustments for Surgery: Continue until night before surgery     rosuvastatin 5 mg tablet  Commonly known as: Crestor  Medication Adjustments for Surgery: Take morning of surgery with sip of water, no other fluids     zinc gluconate 50 mg tablet  Medication Adjustments for Surgery: Stop 7 days before surgery                              NPO Instructions:    Do not eat any food after midnight the night before your surgery/procedure.    Additional Instructions:     Day of  Surgery:  Wear  comfortable loose fitting clothing  Do not use moisturizers, creams, lotions or perfume  All jewelry and valuables should be left at home  Preoperative Fasting Guidelines    Why must I stop eating and drinking near surgery time?  With sedation, food or liquid in your stomach can enter your lungs causing serious complications  Increases nausea and vomiting    When do I need to stop eating and drinking before my surgery?  Do not eat any food or drink any liquids after midnight the night before your surgery/procedure.  You may have sips of water to take medications.    PAT DISCHARGE INSTRUCTIONS    Please call the Same Day Surgery (SDS) Department of the hospital where your procedure will be performed after 2:00 PM the day before your surgery. If you are scheduled on a Monday, or a Tuesday following a Monday holiday, you will need to call on the last business day prior to your surgery.    [address], [id number] [phone]  Second Floor      Please let your surgeon know if:      You develop any open sores, shingles, burning or painful urination as these may increase your risk of an infection.   You no longer wish to have the surgery.   Any other personal circumstances change that may lead to the need to cancel or defer this surgery-such as being sick or getting admitted to any hospital within one week of your planned procedure.    Your contact details change, such as a change of address or phone number.    Starting now:     Please DO NOT drink alcohol or smoke for 24 hours before surgery. It is well known that quitting smoking can make a huge difference to your health and recovery from surgery. The longer you abstain from smoking, the better your chances of a healthy recovery. If you need help with quitting, call 9-800-QUIT-NOW to be connected to a trained counselor who will discuss the best methods to help you quit.     Before your  surgery:    Please stop all supplements 7 days prior to surgery. Or as directed by your surgeon.   Please stop taking NSAID pain medicine such as Advil and Motrin 7 days before surgery.    If you develop any fever, cough, cold, rashes, cuts, scratches, scrapes, urinary symptoms or infection anywhere on your body (including teeth and gums) prior to surgery, please call your surgeon’s office as soon as possible. This may require treatment to reduce the chance of cancellation on the day of surgery.    The day before your surgery:   DIET- Please follow the diet instructions at the top of your packet.   Get a good night’s rest.  Use the special soap for bathing if you have been instructed to use one.    Scheduled surgery times may change and you will be notified if this occurs - please check your personal voicemail for any updates.     On the morning of surgery:   Wear comfortable, loose fitting clothes which open in the front. Please do not wear moisturizers, creams, lotions, makeup or perfume.    Please bring with you to surgery:   Photo ID and insurance card   Current list of medicines and allergies   Pacemaker/ Defibrillator/Heart stent cards   CPAP machine and mask    Slings/ splints/ crutches   A copy of your complete advanced directive/DHPOA.    Please do NOT bring with you to surgery:   All jewelry and valuables should be left at home.   Prosthetic devices such as contact lenses, hearing aids, dentures, eyelash extensions, hairpins and body piercings must be removed prior to going in to the surgical suite.    After outpatient surgery:   A responsible adult MUST accompany you at the time of discharge and stay with you for 24 hours after your surgery. You may NOT drive yourself home after surgery.    Do not drive, operate machinery, make critical decisions or do activities that require co-ordination or balance until after a night’s sleep.   Do not drink alcoholic beverages for 24 hours.   Instructions for resuming  your medications will be provided by your surgeon.    CALL YOUR DOCTOR AFTER SURGERY IF YOU HAVE:     Chills and/or a fever of 101° F or higher.    Redness, swelling, pus or drainage from your surgical wound or a bad smell from the wound.    Lightheadedness, fainting or confusion.    Persistent vomiting (throwing up) and are not able to eat or drink for 12 hours.    Three or more loose, watery bowel movements in 24 hours (diarrhea).   Difficulty or pain while urinating( after non-urological surgery)    Pain and swelling in your legs, especially if it is only on one side.    Difficulty breathing or are breathing faster than normal.    Any new concerning symptoms.

## 2024-05-30 PROBLEM — G44.209 HEADACHE, TENSION-TYPE: Status: RESOLVED | Noted: 2023-11-17 | Resolved: 2024-05-30

## 2024-05-30 PROBLEM — R42 EPISODIC LIGHTHEADEDNESS: Status: RESOLVED | Noted: 2023-08-22 | Resolved: 2024-05-30

## 2024-05-30 PROBLEM — N20.0 KIDNEY CALCULUS: Status: RESOLVED | Noted: 2023-11-17 | Resolved: 2024-05-30

## 2024-05-30 PROBLEM — R53.83 FATIGUE: Status: RESOLVED | Noted: 2023-11-17 | Resolved: 2024-05-30

## 2024-05-30 PROBLEM — R42 LIGHTHEADEDNESS: Status: RESOLVED | Noted: 2023-11-17 | Resolved: 2024-05-30

## 2024-05-31 NOTE — PROGRESS NOTES
HEARING AID CHECK    RIGHT: Phonak Audeo M70-RT SN: [id number]  : 1 x M  Wax Guard/Dome: Cerushield/small closed  LEFT: Phonak Audeo M70-RT SN: [id number]  :  2 x P Wax Guard/Dome: Cerushield/  cap     Repair Warranty: out of warranty 4/27/2023  L&D Warranty: out of warranty 4/27/2023     TV CONNECTOR: SN: [id number]     Warranty: out of warranty 4/27/2021    Merline Chowdhury was seen for a hearing aid check following ENT and audiometric evaluation appts.  Listening check revealed {GOOD/FAIR/POOR:22985} working function of devices.  Hearing aids were cleaned and {ha:56516}.  Patient satisfied.    {hacprice:54878}    RECOMMENDATIONS:  -Recommend patient return in ~6 months or sooner should concerns arise.    Ruth Conde, CCC-A    Appt time: *** - ***

## 2024-06-02 NOTE — PROGRESS NOTES
"Subjective   Patient ID: Merline Chowdhury \"Nata\" is a 73 y.o. female who presents for No chief complaint on file..  HPI  This 73-year-old female last seen in the office in November 2023 is being seen in follow-up.  She has been seen over a number of years by my former associate.  She has a tendency for heavy cerumen buildup at times complicating a mixed underlying hearing loss in her left ear and a sensorineural hearing loss in her right ear.  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  Ear canal dryness has been noticed in the past causing itching and she has been treated with Lotrisone ointment.  Her last audiogram did reveal evidence for moderately severe to severe mixed hearing loss on the left ear and a moderate to moderately severe sensorineural hearing loss on the right ear.  Discrimination ability was 84% on the left 96% on the right at elevated thresholds.  Review of Systems  A 12 point ROS has been reviewed and are negative for complaint except what is stated in the history of present illness and/or past medical history as noted in the EMR    Active Ambulatory Problems     Diagnosis Date Noted    Abnormal glucose 12/07/2015    Abnormal pigmentation of skin 06/19/2019    Age-related osteoporosis without current pathological fracture 04/29/2020    Chronic otitis media of left ear 08/22/2023    Chronic otitis media 11/17/2023    Chronic pain 11/17/2023    Fibromyalgia 12/08/2015    DDD (degenerative disc disease), lumbar 11/17/2023    Degeneration of intervertebral disc of lumbar region 11/17/2023    Edema 12/07/2015    Excessive cerumen in ear canal, right 08/22/2023    Failed back surgical syndrome 11/17/2023    Vertebrogenic low back pain 11/17/2023    Hearing loss 12/07/2015    Hemangioma of skin and subcutaneous tissue 06/19/2019    History of hypertension 11/17/2023    History of metabolic disorder 11/17/2023    Hypertension 12/07/2015    Mast cell activation, " unspecified (Multi) 01/01/2023    Mild hyperparathyroidism (Multi) 03/14/2019    Mixed conductive and sensorineural hearing loss, unilateral, left ear with restricted hearing on the contralateral side 08/22/2023    Mixed hyperlipidemia 04/29/2020    Osteoarthritis of hand 12/07/2015    Other seborrheic keratosis 06/19/2019    Chronic otitis externa of left ear 08/22/2023    Impacted cerumen of left ear 11/17/2023    Otorrhea, left 11/17/2023    Otorrhea 11/17/2023    Sciatica 12/08/2015    Vitamin D deficiency 12/07/2015     Resolved Ambulatory Problems     Diagnosis Date Noted    Episodic lightheadedness 08/22/2023    Lightheadedness 11/17/2023    Headache, tension-type 11/17/2023    Fatigue 11/17/2023    Kidney calculus 11/17/2023     Past Medical History:   Diagnosis Date    Delayed emergence from general anesthesia     Other allergy status, other than to drugs and biological substances     Personal history of other diseases of the circulatory system     Personal history of other endocrine, nutritional and metabolic disease          Current Outpatient Medications:     albuterol 90 mcg/actuation inhaler, Inhale 2 puffs every 4 hours if needed for wheezing or shortness of breath., Disp: , Rfl:     calcium carbonate-vitamin D3 600 mg-20 mcg (800 unit) tablet, Take 1 tablet by mouth., Disp: , Rfl:     carvedilol (Coreg) 6.25 mg tablet, Take 1 tablet (6.25 mg) by mouth twice a day., Disp: , Rfl:     cetirizine (ZyrTEC) 10 mg tablet, Take 1 tablet (10 mg) by mouth once daily as needed for allergies or rhinitis., Disp: , Rfl:     cholecalciferol (Vitamin D-3) 50 MCG (2000 UT) tablet, Take 1 tablet (2,000 Units) by mouth once daily., Disp: , Rfl:     cyanocobalamin (Vitamin B-12) 1,000 mcg tablet, Take 1 tablet (1,000 mcg) by mouth once daily., Disp: , Rfl:     cyclobenzaprine (Flexeril) 10 mg tablet, Take 1 tablet (10 mg) by mouth 2 times a day as needed for muscle spasms., Disp: , Rfl:     fluticasone (Flonase) 50  mcg/actuation nasal spray, Administer 1 spray into each nostril once daily as needed for allergies., Disp: , Rfl:     ibuprofen 200 mg tablet, Take 1 tablet (200 mg) by mouth every 8 hours if needed for mild pain (1 - 3) or moderate pain (4 - 6)., Disp: , Rfl:     ipratropium (Atrovent) 21 mcg (0.03 %) nasal spray, 2 sprays 2 times a day as needed., Disp: , Rfl:     magnesium oxide 500 mg capsule, Take 1 capsule (500 mg) by mouth once daily., Disp: , Rfl:     nystatin-triamcinolone (Mycolog II) cream, Apply 1 Application topically once daily as needed (RASH). Apply to affected area, Disp: , Rfl:     rosuvastatin (Crestor) 5 mg tablet, once every 24 hours., Disp: , Rfl:     zinc gluconate 50 mg tablet, Take 1 tablet (50 mg of elemental zinc) by mouth once daily., Disp: , Rfl:      Social History     Tobacco Use    Smoking status: Never    Smokeless tobacco: Never   Substance Use Topics    Alcohol use: Not Currently     Comment: social occasional        Allergies   Allergen Reactions    Alendronate Other and Unknown     Muscle pain    Other reaction(s): Other (See Comments)   Muscle pain   Took it for 4 weeks. Muscle stiffness - back, thighs. Symptoms resolved after stopping it.    Amlodipine Swelling and Unknown     5 mg: Edema.     5 mg: Edema.    Annatto Hives and Itching    Cephalexin Anaphylaxis and Unknown     Throat closing as young adult.    Erythromycin Hives, Rash and Unknown     Can take z-divya.    Hydrochlorothiazide Itching and Unknown     Severe muscle cramps.    Lisinopril Itching and Unknown     Cough.    Losartan Hives and Itching     Losartan 25 mg & 50 mg: Hives.     Losartan 25 mg & 50 mg: Hives.    Tetracyclines Hives, Nausea Only and Unknown     Can take doxycycline.     Can take doxycycline.    Tizanidine Palpitations and Unknown     Dry mouth, Headache.     Dry mouth, Headache.    Alendronate Sodium Other     Took it for 4 weeks. Muscle stiffness - back, thighs. Symptoms resolved after stopping  it.    Atorvastatin Other    Ciprofloxacin Nausea Only    House Dust Mite Unknown    Iodine Other     Topical Iodine: Rash.  BLISTERS    Topical Iodine: Rash.    Mold Unknown    Pork Extract Other    Pork/Porcine Containing Products Unknown    Simvastatin Other     Muscle aches    Sulfa (Sulfonamide Antibiotics) Other and Unknown    Latex Rash and Unknown    Moxifloxacin Nausea Only    Olopatadine Itching, Rash and Swelling        There were no vitals taken for this visit.   Objective   Physical Exam  EXAMINATION:     GENERAL DISHA.EARANCE: Alert, in no acute distress, normal pitch and clarity of voice, well-developed and nourished, cooperative.     HEAD/FACE: Normocephalic, atraumatic, normal facial movements and strength, no no tenderness to palpation, no lesions noted.     SKIN: Normal turgor, no raised or ulcerative lesions, warm and dry to palpation.     EYES: Extraocular motions intact, no nystagmus noted, pupils equal and reactive to light and accommodation, no conjunctivitis.      EARS:    On the left I cleaned the wax from the entrance of the ear canal. She has a dry posterior central perforation with a sclerotic anterior tympanic membrane. On the right she had a moderate amount of wax which was carefully cleaned from the ear canal. The right tympanic membrane was intact.  See procedure note     NOSE: No external skin lesions are noted, nares are patent, septum is intact, sinuses are nontender to palpation bilaterally, no intranasal lesions or inflammation is noted, nasal valve is normal.     OROPHARYNX/ORAL CAVITY: Oropharynx is not inflamed and is without lesions, mucosa of the oral cavity is intact and without lesions, tongue is midline and mobile, no acute dental disease is noted, TMJs are mobile     LUNG-- NO wheezing or rhonchi normal respiratory effort     HEART-- No venous congestion,  rate and rhythm regular,     NECK: No lymphadenopathy is palpated, carotid pulses are intact, neck is supple with  "full range of motion, no thyroid abnormalities are noted, trachea is midline, no neck masses are palpated.     LYMPHATICS: No cervical adenopathy or supraclavicular adenopathy is palpated.     NEUROLOGIC/PSYCH; alert and oriented, cranial nerves are grossly intact, gait is without falling, no motor deficits are noted.     Patient ID: Merline Chowdhury \"Rose" is a 73 y.o. female.    Procedures  Assessment/Plan   {Assess/PlanSmartLinks:92516}         Matthew Cody DMD, MD 06/02/24 10:34 AM   " T(C): 37.2 (09-20-22 @ 08:15), Max: 37.2 (09-20-22 @ 08:15)  HR: 99 (09-20-22 @ 08:15) (99 - 99)  BP: 118/83 (09-20-22 @ 08:15) (118/83 - 118/83)  RR: 18 (09-20-22 @ 08:15) (18 - 18)  SpO2: 96% (09-20-22 @ 08:15) (96% - 96%)    PHYSICAL EXAM:  GENERAL: NAD, well-developed  HEAD:  Atraumatic, Normocephalic  EYES: EOMI, PERRLA, conjunctiva and sclera clear  NECK: Supple, No JVD  CHEST/LUNG: Clear to auscultation bilaterally; No wheeze  HEART: Regular rate and rhythm; No murmurs, rubs, or gallops  ABDOMEN: Soft, Nontender, Nondistended; Bowel sounds present  EXTREMITIES:  2+ Peripheral Pulses, No clubbing, cyanosis, or edema  PSYCH: AAOx3  NEUROLOGY: non-focal  SKIN: No rashes or lesions

## 2024-06-05 ENCOUNTER — APPOINTMENT (OUTPATIENT)
Dept: AUDIOLOGY | Facility: CLINIC | Age: 74
End: 2024-06-05
Payer: MEDICARE

## 2024-06-05 ENCOUNTER — APPOINTMENT (OUTPATIENT)
Dept: OTOLARYNGOLOGY | Facility: CLINIC | Age: 74
End: 2024-06-05
Payer: MEDICARE

## 2024-06-05 ENCOUNTER — APPOINTMENT (OUTPATIENT)
Dept: AUDIOLOGY | Facility: CLINIC | Age: 74
End: 2024-06-05

## 2024-06-05 DIAGNOSIS — H61.23 BILATERAL IMPACTED CERUMEN: ICD-10-CM

## 2024-06-05 DIAGNOSIS — H90.A21 SENSORINEURAL HEARING LOSS (SNHL) OF RIGHT EAR WITH RESTRICTED HEARING OF LEFT EAR: ICD-10-CM

## 2024-06-05 DIAGNOSIS — H66.92 CHRONIC OTITIS MEDIA OF LEFT EAR: Primary | ICD-10-CM

## 2024-06-05 DIAGNOSIS — H90.A32 MIXED CONDUCTIVE AND SENSORINEURAL HEARING LOSS OF LEFT EAR WITH RESTRICTED HEARING OF RIGHT EAR: ICD-10-CM

## 2024-06-07 ENCOUNTER — HOSPITAL ENCOUNTER (OUTPATIENT)
Facility: HOSPITAL | Age: 74
Setting detail: OUTPATIENT SURGERY
Discharge: HOME | End: 2024-06-07
Attending: ANESTHESIOLOGY | Admitting: ANESTHESIOLOGY
Payer: MEDICARE

## 2024-06-07 ENCOUNTER — ANESTHESIA EVENT (OUTPATIENT)
Dept: OPERATING ROOM | Facility: HOSPITAL | Age: 74
End: 2024-06-07
Payer: MEDICARE

## 2024-06-07 ENCOUNTER — ANESTHESIA (OUTPATIENT)
Dept: OPERATING ROOM | Facility: HOSPITAL | Age: 74
End: 2024-06-07
Payer: MEDICARE

## 2024-06-07 ENCOUNTER — PHARMACY VISIT (OUTPATIENT)
Dept: PHARMACY | Facility: CLINIC | Age: 74
End: 2024-06-07
Payer: COMMERCIAL

## 2024-06-07 ENCOUNTER — APPOINTMENT (OUTPATIENT)
Dept: RADIOLOGY | Facility: HOSPITAL | Age: 74
End: 2024-06-07
Payer: MEDICARE

## 2024-06-07 VITALS
OXYGEN SATURATION: 99 % | WEIGHT: 165.12 LBS | SYSTOLIC BLOOD PRESSURE: 145 MMHG | RESPIRATION RATE: 18 BRPM | HEIGHT: 60 IN | HEART RATE: 67 BPM | TEMPERATURE: 97.2 F | BODY MASS INDEX: 32.42 KG/M2 | DIASTOLIC BLOOD PRESSURE: 76 MMHG

## 2024-06-07 DIAGNOSIS — M54.51 VERTEBROGENIC LOW BACK PAIN: Primary | ICD-10-CM

## 2024-06-07 DIAGNOSIS — M51.36 DDD (DEGENERATIVE DISC DISEASE), LUMBAR: ICD-10-CM

## 2024-06-07 PROBLEM — T88.59XA DELAYED EMERGENCE FROM ANESTHESIA: Status: ACTIVE | Noted: 2024-06-07

## 2024-06-07 PROCEDURE — 3700000002 HC GENERAL ANESTHESIA TIME - EACH INCREMENTAL 1 MINUTE: Performed by: ANESTHESIOLOGY

## 2024-06-07 PROCEDURE — 7100000001 HC RECOVERY ROOM TIME - INITIAL BASE CHARGE: Performed by: ANESTHESIOLOGY

## 2024-06-07 PROCEDURE — 2500000004 HC RX 250 GENERAL PHARMACY W/ HCPCS (ALT 636 FOR OP/ED): Performed by: ANESTHESIOLOGY

## 2024-06-07 PROCEDURE — C1889 IMPLANT/INSERT DEVICE, NOC: HCPCS | Performed by: ANESTHESIOLOGY

## 2024-06-07 PROCEDURE — 2720000007 HC OR 272 NO HCPCS: Performed by: ANESTHESIOLOGY

## 2024-06-07 PROCEDURE — 64628 TRML DSTRJ IOS BVN 1ST 2 L/S: CPT | Performed by: ANESTHESIOLOGY

## 2024-06-07 PROCEDURE — 7100000009 HC PHASE TWO TIME - INITIAL BASE CHARGE: Performed by: ANESTHESIOLOGY

## 2024-06-07 PROCEDURE — 3600000002 HC OR TIME - INITIAL BASE CHARGE - PROCEDURE LEVEL TWO: Performed by: ANESTHESIOLOGY

## 2024-06-07 PROCEDURE — 2500000001 HC RX 250 WO HCPCS SELF ADMINISTERED DRUGS (ALT 637 FOR MEDICARE OP): Performed by: ANESTHESIOLOGY

## 2024-06-07 PROCEDURE — RXMED WILLOW AMBULATORY MEDICATION CHARGE

## 2024-06-07 PROCEDURE — 2500000005 HC RX 250 GENERAL PHARMACY W/O HCPCS: Performed by: ANESTHESIOLOGY

## 2024-06-07 PROCEDURE — 7100000010 HC PHASE TWO TIME - EACH INCREMENTAL 1 MINUTE: Performed by: ANESTHESIOLOGY

## 2024-06-07 PROCEDURE — 64629 TRML DSTRJ IOS BVN EA ADDL: CPT | Performed by: ANESTHESIOLOGY

## 2024-06-07 PROCEDURE — 3700000001 HC GENERAL ANESTHESIA TIME - INITIAL BASE CHARGE: Performed by: ANESTHESIOLOGY

## 2024-06-07 PROCEDURE — 7100000002 HC RECOVERY ROOM TIME - EACH INCREMENTAL 1 MINUTE: Performed by: ANESTHESIOLOGY

## 2024-06-07 PROCEDURE — A4649 SURGICAL SUPPLIES: HCPCS | Performed by: ANESTHESIOLOGY

## 2024-06-07 PROCEDURE — 2500000005 HC RX 250 GENERAL PHARMACY W/O HCPCS: Performed by: NURSE ANESTHETIST, CERTIFIED REGISTERED

## 2024-06-07 PROCEDURE — 2500000004 HC RX 250 GENERAL PHARMACY W/ HCPCS (ALT 636 FOR OP/ED): Mod: JZ | Performed by: ANESTHESIOLOGY

## 2024-06-07 PROCEDURE — 3600000007 HC OR TIME - EACH INCREMENTAL 1 MINUTE - PROCEDURE LEVEL TWO: Performed by: ANESTHESIOLOGY

## 2024-06-07 PROCEDURE — 2500000004 HC RX 250 GENERAL PHARMACY W/ HCPCS (ALT 636 FOR OP/ED): Performed by: NURSE ANESTHETIST, CERTIFIED REGISTERED

## 2024-06-07 RX ORDER — ALBUTEROL SULFATE 0.83 MG/ML
2.5 SOLUTION RESPIRATORY (INHALATION) EVERY 30 MIN PRN
Status: CANCELLED | OUTPATIENT
Start: 2024-06-07

## 2024-06-07 RX ORDER — PROPOFOL 10 MG/ML
INJECTION, EMULSION INTRAVENOUS AS NEEDED
Status: DISCONTINUED | OUTPATIENT
Start: 2024-06-07 | End: 2024-06-07

## 2024-06-07 RX ORDER — LIDOCAINE HYDROCHLORIDE 10 MG/ML
INJECTION INFILTRATION; PERINEURAL AS NEEDED
Status: DISCONTINUED | OUTPATIENT
Start: 2024-06-07 | End: 2024-06-07

## 2024-06-07 RX ORDER — FENTANYL CITRATE 50 UG/ML
INJECTION, SOLUTION INTRAMUSCULAR; INTRAVENOUS AS NEEDED
Status: DISCONTINUED | OUTPATIENT
Start: 2024-06-07 | End: 2024-06-07

## 2024-06-07 RX ORDER — ONDANSETRON HYDROCHLORIDE 2 MG/ML
INJECTION, SOLUTION INTRAVENOUS AS NEEDED
Status: DISCONTINUED | OUTPATIENT
Start: 2024-06-07 | End: 2024-06-07

## 2024-06-07 RX ORDER — METHYLPREDNISOLONE 4 MG/1
TABLET ORAL
Qty: 21 TABLET | Refills: 0 | Status: SHIPPED | OUTPATIENT
Start: 2024-06-07 | End: 2024-06-14

## 2024-06-07 RX ORDER — LABETALOL HYDROCHLORIDE 5 MG/ML
5 INJECTION, SOLUTION INTRAVENOUS EVERY 5 MIN PRN
Status: CANCELLED | OUTPATIENT
Start: 2024-06-07

## 2024-06-07 RX ORDER — IPRATROPIUM BROMIDE 0.5 MG/2.5ML
500 SOLUTION RESPIRATORY (INHALATION) EVERY 30 MIN PRN
Status: CANCELLED | OUTPATIENT
Start: 2024-06-07

## 2024-06-07 RX ORDER — FENTANYL CITRATE 50 UG/ML
50 INJECTION, SOLUTION INTRAMUSCULAR; INTRAVENOUS EVERY 5 MIN PRN
Status: CANCELLED | OUTPATIENT
Start: 2024-06-07

## 2024-06-07 RX ORDER — SODIUM CHLORIDE, SODIUM LACTATE, POTASSIUM CHLORIDE, CALCIUM CHLORIDE 600; 310; 30; 20 MG/100ML; MG/100ML; MG/100ML; MG/100ML
100 INJECTION, SOLUTION INTRAVENOUS CONTINUOUS
Status: DISCONTINUED | OUTPATIENT
Start: 2024-06-07 | End: 2024-06-07 | Stop reason: HOSPADM

## 2024-06-07 RX ORDER — ACETAMINOPHEN 500 MG
500 TABLET ORAL EVERY 6 HOURS PRN
COMMUNITY

## 2024-06-07 RX ORDER — ONDANSETRON HYDROCHLORIDE 2 MG/ML
4 INJECTION, SOLUTION INTRAVENOUS ONCE AS NEEDED
Status: CANCELLED | OUTPATIENT
Start: 2024-06-07

## 2024-06-07 RX ORDER — CLINDAMYCIN PHOSPHATE 900 MG/50ML
INJECTION, SOLUTION INTRAVENOUS
Status: COMPLETED
Start: 2024-06-07 | End: 2024-06-07

## 2024-06-07 RX ORDER — SODIUM CHLORIDE, SODIUM LACTATE, POTASSIUM CHLORIDE, CALCIUM CHLORIDE 600; 310; 30; 20 MG/100ML; MG/100ML; MG/100ML; MG/100ML
40 INJECTION, SOLUTION INTRAVENOUS CONTINUOUS
Status: CANCELLED | OUTPATIENT
Start: 2024-06-07

## 2024-06-07 RX ORDER — CLINDAMYCIN PHOSPHATE 900 MG/50ML
900 INJECTION, SOLUTION INTRAVENOUS ONCE
Status: COMPLETED | OUTPATIENT
Start: 2024-06-07 | End: 2024-06-07

## 2024-06-07 RX ORDER — PHENYLEPHRINE HYDROCHLORIDE 10 MG/ML
INJECTION INTRAVENOUS AS NEEDED
Status: DISCONTINUED | OUTPATIENT
Start: 2024-06-07 | End: 2024-06-07

## 2024-06-07 RX ORDER — BUPIVACAINE HYDROCHLORIDE 5 MG/ML
INJECTION, SOLUTION PERINEURAL AS NEEDED
Status: DISCONTINUED | OUTPATIENT
Start: 2024-06-07 | End: 2024-06-07 | Stop reason: HOSPADM

## 2024-06-07 RX ORDER — OXYCODONE HYDROCHLORIDE 5 MG/1
5 TABLET ORAL ONCE AS NEEDED
Status: COMPLETED | OUTPATIENT
Start: 2024-06-07 | End: 2024-06-07

## 2024-06-07 RX ORDER — MIDAZOLAM HYDROCHLORIDE 1 MG/ML
INJECTION, SOLUTION INTRAMUSCULAR; INTRAVENOUS AS NEEDED
Status: DISCONTINUED | OUTPATIENT
Start: 2024-06-07 | End: 2024-06-07

## 2024-06-07 RX ORDER — HYDROCODONE BITARTRATE AND ACETAMINOPHEN 5; 325 MG/1; MG/1
1 TABLET ORAL EVERY 6 HOURS PRN
Qty: 20 TABLET | Refills: 0 | Status: SHIPPED | OUTPATIENT
Start: 2024-06-07 | End: 2024-06-12

## 2024-06-07 RX ADMIN — CLINDAMYCIN IN 5 PERCENT DEXTROSE 900 MG: 18 INJECTION, SOLUTION INTRAVENOUS at 07:52

## 2024-06-07 RX ADMIN — PROPOFOL 30 MG: 10 INJECTION, EMULSION INTRAVENOUS at 07:49

## 2024-06-07 RX ADMIN — PHENYLEPHRINE HYDROCHLORIDE 100 MCG: 10 INJECTION INTRAVENOUS at 08:21

## 2024-06-07 RX ADMIN — ONDANSETRON HYDROCHLORIDE 4 MG: 2 INJECTION INTRAMUSCULAR; INTRAVENOUS at 08:12

## 2024-06-07 RX ADMIN — OXYCODONE HYDROCHLORIDE 5 MG: 5 TABLET ORAL at 10:45

## 2024-06-07 RX ADMIN — PROPOFOL 100 MCG/KG/MIN: 10 INJECTION, EMULSION INTRAVENOUS at 07:48

## 2024-06-07 RX ADMIN — PHENYLEPHRINE HYDROCHLORIDE 100 MCG: 10 INJECTION INTRAVENOUS at 08:26

## 2024-06-07 RX ADMIN — SODIUM CHLORIDE, SODIUM LACTATE, POTASSIUM CHLORIDE, AND CALCIUM CHLORIDE 100 ML/HR: 600; 310; 30; 20 INJECTION, SOLUTION INTRAVENOUS at 06:52

## 2024-06-07 RX ADMIN — FENTANYL CITRATE 50 MCG: 50 INJECTION, SOLUTION INTRAMUSCULAR; INTRAVENOUS at 07:48

## 2024-06-07 RX ADMIN — MIDAZOLAM 1 MG: 1 INJECTION INTRAMUSCULAR; INTRAVENOUS at 07:39

## 2024-06-07 RX ADMIN — LIDOCAINE HYDROCHLORIDE 5 ML: 10 INJECTION, SOLUTION INFILTRATION; PERINEURAL at 07:48

## 2024-06-07 RX ADMIN — FENTANYL CITRATE 50 MCG: 50 INJECTION, SOLUTION INTRAMUSCULAR; INTRAVENOUS at 07:58

## 2024-06-07 SDOH — HEALTH STABILITY: MENTAL HEALTH: CURRENT SMOKER: 0

## 2024-06-07 ASSESSMENT — PAIN SCALES - GENERAL
PAINLEVEL_OUTOF10: 3
PAINLEVEL_OUTOF10: 4
PAINLEVEL_OUTOF10: 0 - NO PAIN
PAINLEVEL_OUTOF10: 7
PAINLEVEL_OUTOF10: 7
PAINLEVEL_OUTOF10: 0 - NO PAIN
PAINLEVEL_OUTOF10: 0 - NO PAIN

## 2024-06-07 ASSESSMENT — PAIN - FUNCTIONAL ASSESSMENT
PAIN_FUNCTIONAL_ASSESSMENT: 0-10
PAIN_FUNCTIONAL_ASSESSMENT: FLACC (FACE, LEGS, ACTIVITY, CRY, CONSOLABILITY)
PAIN_FUNCTIONAL_ASSESSMENT: 0-10

## 2024-06-07 ASSESSMENT — PAIN DESCRIPTION - DESCRIPTORS
DESCRIPTORS: PRESSURE
DESCRIPTORS: ACHING
DESCRIPTORS: PRESSURE

## 2024-06-07 ASSESSMENT — PAIN DESCRIPTION - LOCATION: LOCATION: BACK

## 2024-06-07 ASSESSMENT — PAIN DESCRIPTION - ORIENTATION: ORIENTATION: LOWER

## 2024-06-07 NOTE — DISCHARGE INSTRUCTIONS
You may shower 48 hours after surgery.  Do not bathe or swim until after after your follow up appointment    If you have any radiating leg pain that is new or different please start Medrol Dosepak.

## 2024-06-07 NOTE — PERIOPERATIVE NURSING NOTE
"0615--Patient ambulated to [facility] accompanied by her daughter Marychuy.  Patient is alert and oriented, rates her pain at a \"4/10\" at this time and reports her skin is intact.  Patient states she did not take any pills this morning but did bring her BP medication in case she needed it.     0633--Patient took her AM Carvedilol.    0707--Patient ambulated to the restroom.    0715--Dr. Leonard at the bedside.  "

## 2024-06-07 NOTE — ANESTHESIA POSTPROCEDURE EVALUATION
"Patient: Merline Chowdhury \"Nata\"    Procedure Summary       Date: 06/07/24 Room / Location: [facility] 04 / [facility]    Anesthesia Start: 0741 Anesthesia Stop: 0919    Procedure: RADIOFREQUENCY ABLATION,NERVE,BASIVERTEBRAL (Spine Lumbar) Diagnosis:       Vertebrogenic low back pain      (Vertebrogenic low back pain [M54.51])    Surgeons: Rafi Leonard MD Responsible Provider: Abdiel Eason DO    Anesthesia Type: MAC ASA Status: 2            Anesthesia Type: MAC    Vitals Value Taken Time   /81 06/07/24 0916   Temp 35.8 °C (96.4 °F) 06/07/24 0916   Pulse 78 06/07/24 0916   Resp 12 06/07/24 0916   SpO2 97 % 06/07/24 0917       Anesthesia Post Evaluation    Patient location during evaluation: bedside  Patient participation: complete - patient participated  Level of consciousness: awake and alert  Pain management: adequate  Multimodal analgesia pain management approach  Airway patency: patent  Two or more strategies used to mitigate risk of obstructive sleep apnea  Cardiovascular status: acceptable  Respiratory status: acceptable  Hydration status: acceptable  Postoperative Nausea and Vomiting: none        No notable events documented.    "

## 2024-06-07 NOTE — ANESTHESIA PREPROCEDURE EVALUATION
"Patient: Merline Chowdhury \"Nata\"    Procedure Information       Date/Time: 06/07/24 0730    Procedure: RADIOFREQUENCY ABLATION,NERVE,BASIVERTEBRAL (Spine Lumbar)    Location: [facility] / [facility]    Surgeons: Rafi Leonard MD          Past Medical History:   Diagnosis Date    Delayed emergence from general anesthesia     Episodic lightheadedness 08/22/2023    Fatigue 11/17/2023    Headache, tension-type 11/17/2023    Kidney calculus 11/17/2023    Lightheadedness 11/17/2023    Other allergy status, other than to drugs and biological substances     Environmental allergies    Personal history of other diseases of the circulatory system     History of hypertension    Personal history of other endocrine, nutritional and metabolic disease     History of high cholesterol     Past Surgical History:   Procedure Laterality Date    APPENDECTOMY  08/10/2015    Appendectomy    BACK SURGERY  03/06/2017    Back Surgery    HAND SURGERY  08/10/2015    Hand Surgery                                                                                                                                                          HYSTERECTOMY  08/10/2015    Hysterectomy    TONSILLECTOMY  08/10/2015    Tonsillectomy With Adenoidectomy       Relevant Problems   Anesthesia   (+) Delayed emergence from anesthesia      Cardiac   (+) Hypertension   (+) Mixed hyperlipidemia      Neuro   (+) Sciatica      Endocrine   (+) Mild hyperparathyroidism (Multi)      Musculoskeletal   (+) DDD (degenerative disc disease), lumbar   (+) Degeneration of intervertebral disc of lumbar region   (+) Fibromyalgia   (+) Osteoarthritis of hand      HEENT   (+) Hearing loss   (+) Mixed conductive and sensorineural hearing loss, unilateral, left ear with restricted hearing on the contralateral side       Clinical information reviewed:    Allergies  Meds               NPO Detail:  NPO/Void Status  Carbohydrate Drink Given Prior to Surgery? : N  Date of Last " Liquid: 06/06/24  Time of Last Liquid: 2330  Date of Last Solid: 06/06/24  Time of Last Solid: 2030  Last Intake Type: Clear fluids  Time of Last Void: 0615         Physical Exam    Airway  Mallampati: II  TM distance: >3 FB  Neck ROM: full     Cardiovascular    Dental    Pulmonary    Abdominal            Anesthesia Plan    History of general anesthesia?: yes  History of complications of general anesthesia?: no    ASA 2     MAC     The patient is not a current smoker.  Patient was not previously instructed to abstain from smoking on day of procedure.  Patient did not smoke on day of procedure.  Education provided regarding risk of obstructive sleep apnea.  intravenous induction   Postoperative administration of opioids is intended.  Anesthetic plan and risks discussed with patient.  Use of blood products discussed with patient who consented to blood products.    Plan discussed with CRNA and CAA.

## 2024-06-07 NOTE — OP NOTE
"RADIOFREQUENCY ABLATION,NERVE,BASIVERTEBRAL Operative Note     Date: 2024  OR Location: [facility] OR    Name: Merline Chowdhury \"Nata\", : 1950, Age: 73 y.o., MRN: [id number], Sex: female    Diagnosis  Pre-op Diagnosis     * Vertebrogenic low back pain [M54.51] Post-op Diagnosis     * Vertebrogenic low back pain [M54.51]     Procedures  RADIOFREQUENCY ABLATION,NERVE,BASIVERTEBRAL  89023 - NM THERMAL DSTRJ INTRAOSSEOUS BVN 1ST 2 LMBR/SAC    NM THERMAL DSTRJ INTRAOSSEOUS BVN EA ADDL LMBR/SAC [45141]  Surgeons      * Rafi Leonard - Primary    Resident/Fellow/Other Assistant:  Surgeons and Role:  * No surgeons found with a matching role *    Procedure Summary  Anesthesia: Monitor Anesthesia Care  ASA: II  Anesthesia Staff: Anesthesiologist: Abdiel Eason DO  CRNA: DESIRAE Galvin  Estimated Blood Loss: 15mL  Intra-op Medications:   Administrations occurring from 0730 to 0930 on 24:   Medication Name Total Dose   clindamycin in D5W (Cleocin) IVPB  - Omnicell Override Pull Cannot be calculated   clindamycin in D5W (Cleocin) IVPB 900 mg 900 mg              Anesthesia Record               Intraprocedure I/O Totals          Intake    Propofol Drip 0.00 mL    The total shown is the total volume documented since Anesthesia Start was filed.    clindamycin in D5W (Cleocin) IVPB 900 mg 50.00 mL    Total Intake 50 mL          Specimen: No specimens collected     Staff:   Circulator: Maria Fernanda  Circulator: Brionna Middletonub Person: Maria C  Scrub Person: [facility]         Drains and/or Catheters: * None in log *    Tourniquet Times:         Implants:     Findings:     Indications: Nata Chowdhury is an 73 y.o. female who is having surgery for Vertebrogenic low back pain [M54.51].     The patient was seen in the preoperative area. The risks, benefits, complications, treatment options, non-operative alternatives, expected recovery and outcomes were discussed with the patient. The possibilities of reaction to " medication, pulmonary aspiration, injury to surrounding structures, bleeding, recurrent infection, the need for additional procedures, failure to diagnose a condition, and creating a complication requiring transfusion or operation were discussed with the patient. The patient concurred with the proposed plan, giving informed consent.  The site of surgery was properly noted/marked if necessary per policy. The patient has been actively warmed in preoperative area. Preoperative antibiotics have been ordered and given within 1 hours of incision. Venous thrombosis prophylaxis have been ordered including bilateral sequential compression devices    Procedure Details:   Procedure: Intracept Procedure -L4, L5, S1 Basi-Vertebral Nerve Ablation    Patient was identified in the preoperative area were risks, benefits, alternatives were described informed consent was obtained.  The patient was then taken back to operating room for at [facility] with a were placed prone onto the procedure room table with pillows underneath her abdomen to decrease lumbar lordosis.  The lumbosacral area was then exposed and prepped and draped in normal sterile fashion.  The C-arm was sterilely draped in moved into position to visualize the L4 vertebral body in the AP and lateral plane.  Surgical time-out was then completed and perioperative antibiotics were then infused.  The C-arm was rotated to square off the superior endplate at L4 and rotator approximately 35 degrees to the left to obtain an oblique view with the facet in the midpoint of the vertebral body.  The superior lateral left L4 pedicle was identified, and the skin entry point identified and infiltrated with 10 mL of 0.5% bupivacaine using 25 gauge 1 and 0.5 inch needle.  A 22 gauge 5 inch spinal needle was used to anesthetize the tract to the pedicle and periosteum and confirm introducer cannula trajectory.  A skin incision was made with an 11 blade scalpel.   The 8 gauge introducer cannula with beveled tip was introduced through the skin, subcutaneous tissue and paraspinal muscle until bony contact was made.  The position was checked in the AP and lateral plane.  Using a mallet, the trocar was then advanced through the pedicle to the posterior aspect of the vertebral body using a combination of AP and lateral views to ensure appropriate traversing of the pedicle and no reaching of the pedicle medially.  Once the trocar was in the posterior aspect of the L4 vertebral body, the trocar was removed from the cannula and a curved cannula assembly with nitinol J-Stylet was inserted.  The wing night was rotated counter-clockwise permitting excursion of the J stylet.  The curved cannula assembly was then advanced using a mallet and 1-2 millimeter increments.  The J stylet was observed transverse the vertebral body in the AP and lateral views.  The J stylet was removed and replaced with a straight stylet to reach the BVM target.  Target was reached when the tip of the stylette was noted to be between 30-50% forward of the posterior wall of the L4 in the lateral view mid weight with between the superior and inferior endplates and across the midline of the L3, there was a previous laminectomy at L4, spinous process in the AP view.  The stylet was then removed.  The bipolar radiofrequency probe was connected to the generator and inserted into the introducer cannula.  The wing not was rotated counter-clockwise to retract the PEEK sleep to expose the proximal extra on the radiofrequency probe.  The BVM nerve was then ablated at 85 degrees Celsius for 15 minutes using Reliedionne's standard algorithm.    While the ablation  was occurring at L4 the C-arm was moved to visualize the target at the superior lateral aspect of the L5 using the approach similar to the L4 vertebral body.  The C-arm was rotated cephalad to square off the superior endplate at and rotator approximately 35 degrees to  the right to obtain oblique view with the facet in the midpoint of the vertebral body.  The superior lateral L5 pedicle was identified and the skin entry point identified and infiltrated with 10 mL of 0.5% bupivacaine using a 25 gauge 1/2 inch needle.  A 22 gauge 5 inch spinal needle was used anesthetize the tract to the pedicle and periosteum and confirm introducer cannula trajectory.  A skin incision was made with 11 scalpel blade.  The 8 gauge introducer cannula with L5 tip was then introduced through the skin, subcutaneous tissue and paraspinal muscle until bony contact was made.  The position was checked in the AP and lateral plane.  Using a mallet, the trocar was then advanced through the pedicle to the posterior aspect the vertebral body using a combination of AP and lateral views to ensure appropriate traversing of the pedicle and no breach in the pedicle medially.  Once the trocar was in the posterior aspect of the L5 vertebral body, the trocar was removed from the cannula and curved cannula assembly with the nitinol J stylet was inserted.  The wing not was rotated counter-clockwise permitting excursion of the J stylet.  The curve cannula assembly was then advanced using a mallet and 1-2 millimeter increments.  The J stylet was observed to transverse the vertebral body in the AP and lateral views.  Target was achieved with the tip of the stylette was noted to be between 30-50% forward of the posterior wall of the L5 in the lateral view midway between the superior and inferior endplates and cross the midline of the S1 spinous process in the AP view.  The stylet was then removed.  The bipolar radiofrequency probe was removed from the previous vertebral body, clean and then inserted into the introducer cannula.  The when it was rotated clockwise to retract the sleeve to expose the proximal electrode on the radiofrequency probe the BVM nerve was then ablated at 85 degree Celsius for 15 minutes using RF she  standard algorithm.      While the ablation  was occurring at L5 the C-arm was moved to visualize the target at the superior lateral aspect of the S1 using the approach similar to the L5 vertebral body.  The C-arm was rotated cephalad to square off the superior endplate at and rotator approximately 35 degrees to the left to obtain oblique view with the facet in the midpoint of the vertebral body.  The superior lateral left L5 pedicle was identified and the skin entry point identified and infiltrated with 10 mL of 0.5% bupivacaine using a 25 gauge 1/2 inch needle.  A 22 gauge 5 inch spinal needle was used anesthetize the tract to the pedicle and periosteum and confirm introducer cannula trajectory.  A skin incision was made with 11 scalpel blade.  The 8 gauge introducer cannula with beveled tip was then introduced through the skin, subcutaneous tissue and paraspinal muscle until bony contact was made.  The position was checked in the AP and lateral plane.  Using a mallet, the trocar was then advanced through the pedicle to the posterior aspect the vertebral body using a combination of AP and lateral views to ensure appropriate traversing of the pedicle and no breach in the pedicle medially.  Once the trocar was in the posterior aspect of the L5 vertebral body, the trocar was removed from the cannula and curved cannula assembly with the nitinol J stylet was inserted.  The wing not was rotated counter-clockwise permitting excursion of the J stylet.  The curve cannula assembly was then advanced using a mallet and 1-2 millimeter increments.  The J stylet was observed to transverse the vertebral body in the AP and lateral views.  Target was achieved with the tip of the stylette was noted to be between 30-50% forward of the posterior wall of the L5 in the lateral view midway between the superior and inferior endplates and cross the midline of the L5 spinous process in the AP view.  The stylet was then removed.  The bipolar  radiofrequency probe was removed from the previous vertebral body, clean and then inserted into the introducer cannula.  The when it was rotated clockwise to retract the sleeve to expose the proximal electrode on the radiofrequency probe the BVM nerve was then ablated at 85 degree Celsius for 15 minutes using RF she standard algorithm.        With all ablation is completed the instruments were removed from the vertebral bodies.  The surgical wounds were closed with Dermabond and a sterile dressing was applied.  The patient was returned to supine position anesthesia was reversed.  The patient tolerated the procedure without any issues was brought out to the recovery room in stable condition where vital signs were monitored and pain was controlled.    Complications:  None; patient tolerated the procedure well.    Disposition: PACU - hemodynamically stable.  Condition: stable         Additional Details:     Attending Attestation:     Rafi Leonard  Phone Number: [phone]

## 2024-06-07 NOTE — POST-PROCEDURE NOTE
1000:  Patient returned to Phase II alert and oriented.  IV intact and infusing with LR at KVO.  Dressings to low back dry and intact.  Patient is having pressure to her back, abut 7/10.  Patient given beverage and snack and daughter called to bedside.    1040:  Dr. Leonard updated on patient's pressure discomfort.  Order received to give pain medication, oxycodone 5mg po.    1110:  Home going instructions reviewed with patient and family, no questions or concerns     1123:  Patient ambulated to the bathroom and back with assistance.

## 2024-06-10 ENCOUNTER — APPOINTMENT (OUTPATIENT)
Dept: PAIN MEDICINE | Facility: CLINIC | Age: 74
End: 2024-06-10
Payer: MEDICARE

## 2024-06-10 ASSESSMENT — PAIN SCALES - GENERAL: PAINLEVEL_OUTOF10: 2

## 2024-06-12 ENCOUNTER — OFFICE VISIT (OUTPATIENT)
Dept: PAIN MEDICINE | Facility: CLINIC | Age: 74
End: 2024-06-12
Payer: MEDICARE

## 2024-06-12 VITALS
OXYGEN SATURATION: 100 % | BODY MASS INDEX: 32.39 KG/M2 | SYSTOLIC BLOOD PRESSURE: 144 MMHG | HEART RATE: 69 BPM | RESPIRATION RATE: 18 BRPM | DIASTOLIC BLOOD PRESSURE: 88 MMHG | WEIGHT: 165 LBS | HEIGHT: 60 IN

## 2024-06-12 DIAGNOSIS — M54.51 VERTEBROGENIC LOW BACK PAIN: Primary | ICD-10-CM

## 2024-06-12 PROCEDURE — 1160F RVW MEDS BY RX/DR IN RCRD: CPT | Performed by: PHYSICIAN ASSISTANT

## 2024-06-12 PROCEDURE — 3079F DIAST BP 80-89 MM HG: CPT | Performed by: PHYSICIAN ASSISTANT

## 2024-06-12 PROCEDURE — 1159F MED LIST DOCD IN RCRD: CPT | Performed by: PHYSICIAN ASSISTANT

## 2024-06-12 PROCEDURE — 1036F TOBACCO NON-USER: CPT | Performed by: PHYSICIAN ASSISTANT

## 2024-06-12 PROCEDURE — 3077F SYST BP >= 140 MM HG: CPT | Performed by: PHYSICIAN ASSISTANT

## 2024-06-12 PROCEDURE — 99024 POSTOP FOLLOW-UP VISIT: CPT | Performed by: PHYSICIAN ASSISTANT

## 2024-06-12 ASSESSMENT — PATIENT HEALTH QUESTIONNAIRE - PHQ9
2. FEELING DOWN, DEPRESSED OR HOPELESS: NOT AT ALL
1. LITTLE INTEREST OR PLEASURE IN DOING THINGS: NOT AT ALL
SUM OF ALL RESPONSES TO PHQ9 QUESTIONS 1 & 2: 0

## 2024-06-12 ASSESSMENT — LIFESTYLE VARIABLES
HOW OFTEN DO YOU HAVE SIX OR MORE DRINKS ON ONE OCCASION: NEVER
HOW OFTEN DO YOU HAVE A DRINK CONTAINING ALCOHOL: NEVER
SKIP TO QUESTIONS 9-10: 1
AUDIT-C TOTAL SCORE: 0
HOW MANY STANDARD DRINKS CONTAINING ALCOHOL DO YOU HAVE ON A TYPICAL DAY: PATIENT DOES NOT DRINK

## 2024-06-12 ASSESSMENT — ENCOUNTER SYMPTOMS
ALLERGIC/IMMUNOLOGIC NEGATIVE: 1
CARDIOVASCULAR NEGATIVE: 1
PSYCHIATRIC NEGATIVE: 1
BACK PAIN: 1
NEUROLOGICAL NEGATIVE: 1
CONSTITUTIONAL NEGATIVE: 1
EYES NEGATIVE: 1
GASTROINTESTINAL NEGATIVE: 1
RESPIRATORY NEGATIVE: 1
HEMATOLOGIC/LYMPHATIC NEGATIVE: 1
ENDOCRINE NEGATIVE: 1

## 2024-06-12 ASSESSMENT — PAIN SCALES - GENERAL
PAINLEVEL: 0-NO PAIN
PAINLEVEL_OUTOF10: 0 - NO PAIN

## 2024-06-12 ASSESSMENT — PAIN - FUNCTIONAL ASSESSMENT: PAIN_FUNCTIONAL_ASSESSMENT: NO/DENIES PAIN

## 2024-06-12 NOTE — PROGRESS NOTES
"Subjective   Patient ID: Merline Chowdhury \"Nata\" is a 73 y.o. female who presents for Follow-up (Intracept 6/7).  Patient is a 73-year-old female with vertebral genic low back pain that presents today for follow-up after Intracept procedure.  Patient did not she is having 0-10 pain minor left-sided discomfort 0 stiffness and able to improve increase her ambulation patient has a caregiver here that is providing some historical information.         Review of Systems   Constitutional: Negative.    HENT: Negative.     Eyes: Negative.    Respiratory: Negative.     Cardiovascular: Negative.    Gastrointestinal: Negative.    Endocrine: Negative.    Genitourinary: Negative.    Musculoskeletal:  Positive for back pain.   Skin: Negative.    Allergic/Immunologic: Negative.    Neurological: Negative.    Hematological: Negative.    Psychiatric/Behavioral: Negative.         Objective   Physical Exam  Musculoskeletal:      Comments: Well-healed surgical portals no erythema no tenderness noted on exam         Assessment/Plan   Problem List Items Addressed This Visit             ICD-10-CM    Vertebrogenic low back pain - Primary M54.51   Excess full Intracept procedure.  I did advise patient even though she has 100% pain relief it would be good to continue to slowly reinitiate activities we talked about proper lifting techniques we talked about careful with loading the spine we talked about ambulation and pain journaling.  Patient verbalized understanding her questions were answered.  Patient can follow-up on a as needed nature.         Adolph Hart PA-C 06/12/24 10:52 AM   "

## 2024-06-18 ENCOUNTER — APPOINTMENT (OUTPATIENT)
Dept: PAIN MEDICINE | Facility: CLINIC | Age: 74
End: 2024-06-18
Payer: MEDICARE

## 2025-05-21 NOTE — PROGRESS NOTES
"PATIENT ID  Merline Chowdhury \"Rose" IS A 74 y.o. female WHO PRESENTS FOR      HPI   This 74-year-old female last seen in the office in November 2023 is being seen in follow-up due to concerns about cerumen impaction.  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  She has a history of perforation of the left tympanic membrane along with ear canal symptoms of itching and epithelial scaling.  She had been treated with Lotrisone ointment topically for the latter.  Her last examination had revealed evidence for significant dryness with dry cerumen involving the right ear and on the left-hand side there was similar findings with a posterior perforation of the tympanic membrane in addition to tympanosclerotic changes on the left primarily.  She has not been seen in follow-up as recommended.  ROS    A 12 point ROS  has been reviewed and are negative for complaint except for what is stated in the history of present illness and /or for past medical history as noted in the EMR.     Medical History[1]    Current Medications[2]    Social History[3]    RX Allergies[4]     vitals were not taken for this visit.     PHYSICAL EXAM  EXAMINATION:     GENERAL DISHA.EARANCE: Alert, in no acute distress, normal pitch and clarity of voice, well-developed and nourished, cooperative.     HEAD/FACE: Normocephalic, atraumatic, normal facial movements and strength, no no tenderness to palpation, no lesions noted.     SKIN: Normal turgor, no raised or ulcerative lesions, warm and dry to palpation.     EYES: Extraocular motions intact, no nystagmus noted, pupils equal and reactive to light and accommodation, no conjunctivitis.      EARS:    On the left I cleaned the wax from the entrance of the ear canal. She has a dry posterior central perforation with a sclerotic anterior tympanic " "membrane. On the right she had a obstructive amount of wax which was carefully cleaned from the ear canal see procedure note the right tympanic membrane was intact.  See procedure note     NOSE: No external skin lesions are noted, nares are patent, septum is intact, sinuses are nontender to palpation bilaterally, no intranasal lesions or inflammation is noted, nasal valve is normal.     OROPHARYNX/ORAL CAVITY: Oropharynx is not inflamed and is without lesions, mucosa of the oral cavity is intact and without lesions, tongue is midline and mobile, no acute dental disease is noted, TMJs are mobile     LUNG-- NO wheezing or rhonchi normal respiratory effort     HEART-- No venous congestion,  rate and rhythm regular,     NECK: No lymphadenopathy is palpated, carotid pulses are intact, neck is supple with full range of motion, no thyroid abnormalities are noted, trachea is midline, no neck masses are palpated.     LYMPHATICS: No cervical adenopathy or supraclavicular adenopathy is palpated.     NEUROLOGIC/PSYCH; alert and oriented, cranial nerves are grossly intact, gait is without falling, no motor deficits are noted.     Patient ID: Merline Chowdhury \"Rose" is a 74 y.o. female.    Ear cerumen removal    Date/Time: 5/22/2025 10:16 AM    Performed by: Matthew Cody DMD, MD  Authorized by: Matthew Cody DMD, MD    Consent:     Consent obtained:  Verbal    Consent given by:  Patient    Risks discussed:  Pain and incomplete removal    Alternatives discussed:  No treatment and alternative treatment  Universal protocol:     Procedure explained and questions answered to patient or proxy's satisfaction: yes      Imaging studies available: no      Required blood products, implants, devices, and special equipment available: no      Patient identity confirmed:  Verbally with patient  Procedure details:     Location:  L ear and R ear    Procedure type: curette      Procedure type comment:  Or suction    Procedure " outcomes: cerumen removed    Post-procedure details:     Inspection:  No bleeding and ear canal clear    Hearing quality:  Improved    Procedure completion:  Tolerated well, no immediate complications  Comments:      Microscopic evaluation of the right ear revealed evidence for complete obstruction from dry ceruminous debris.  This was removed with a wax loop.  Once removed the tympanic membrane was sclerotic but intact.  Left-hand side there was ceruminous debris which was nonobstructive which was removed with a wax loop and alligator forcep.  There was also some loose cerumen down on the surface of the tympanic membrane which was removed by suctioning.  There is a moderate perforation chronic without otorrhea with sclerotic changes to the remainder of the drum.            ASSESSMENT/PLAN  Problem List Items Addressed This Visit           ICD-10-CM    Hearing loss H91.90     Other Visit Diagnoses         Codes      Bilateral impacted cerumen    -  Primary H61.23      Perforation of left tympanic membrane     H72.92      Chronic rhinitis     J31.0          I discussed the clinical finds with the patient.  From the standpoint of her exam she did have obstructive cerumen on the right side which was limiting hearing.  This was removed.  There was no evidence for otorrhea on the left and there was no obstructive wax on that side.  She should stay on a 6-month checkup schedule and she is due for repeat audiogram.  She should also be having her hearing aids checked by her treating audiologist.  The ears is advised although she can lubricate the opening of the ear canals with external hand cream applied with a little finger.  We also talked about barotrauma during airflight which will not occur on her left due to the perforation being present although on the right side if she is had concerns about that using Afrin before flying, airplane plugs on that side during Ascent and descent would be of benefit.  She should also  chew and swallow during flight.  Insufflation of her ear to try and improve airflow if it is beginning to feel plugged would be advised as well.  Check in 6 months is recommended with an audiogram.       [1]   Past Medical History:  Diagnosis Date    Delayed emergence from general anesthesia     Episodic lightheadedness 08/22/2023    Fatigue 11/17/2023    Headache, tension-type 11/17/2023    Kidney calculus 11/17/2023    Lightheadedness 11/17/2023    Other allergy status, other than to drugs and biological substances     Environmental allergies    Personal history of other diseases of the circulatory system     History of hypertension    Personal history of other endocrine, nutritional and metabolic disease     History of high cholesterol   [2]   Current Outpatient Medications:     acetaminophen (Tylenol) 500 mg tablet, Take 1 tablet (500 mg) by mouth every 6 hours if needed for mild pain (1 - 3)., Disp: , Rfl:     albuterol 90 mcg/actuation inhaler, Inhale 2 puffs every 4 hours if needed for wheezing or shortness of breath., Disp: , Rfl:     calcium carbonate-vitamin D3 600 mg-20 mcg (800 unit) tablet, Take 1 tablet by mouth., Disp: , Rfl:     carvedilol (Coreg) 6.25 mg tablet, Take 1 tablet (6.25 mg) by mouth twice a day., Disp: , Rfl:     cetirizine (ZyrTEC) 10 mg tablet, Take 1 tablet (10 mg) by mouth once daily as needed for allergies or rhinitis., Disp: , Rfl:     cholecalciferol (Vitamin D-3) 50 MCG (2000 UT) tablet, Take 1 tablet (2,000 Units) by mouth once daily., Disp: , Rfl:     cyanocobalamin (Vitamin B-12) 1,000 mcg tablet, Take 1 tablet (1,000 mcg) by mouth once daily., Disp: , Rfl:     cyclobenzaprine (Flexeril) 10 mg tablet, Take 1 tablet (10 mg) by mouth 2 times a day as needed for muscle spasms., Disp: , Rfl:     fluticasone (Flonase) 50 mcg/actuation nasal spray, Administer 1 spray into each nostril once daily as needed for allergies., Disp: , Rfl:     ibuprofen 200 mg tablet, Take 1 tablet (200  mg) by mouth every 8 hours if needed for mild pain (1 - 3) or moderate pain (4 - 6)., Disp: , Rfl:     ipratropium (Atrovent) 21 mcg (0.03 %) nasal spray, 2 sprays 2 times a day as needed., Disp: , Rfl:     magnesium oxide 500 mg capsule, Take 1 capsule (500 mg) by mouth once daily., Disp: , Rfl:     nystatin-triamcinolone (Mycolog II) cream, Apply 1 Application topically once daily as needed (RASH). Apply to affected area, Disp: , Rfl:     rosuvastatin (Crestor) 5 mg tablet, once every 24 hours., Disp: , Rfl:     zinc gluconate 50 mg tablet, Take 1 tablet (50 mg of elemental zinc) by mouth once daily., Disp: , Rfl:   [3]   Social History  Tobacco Use    Smoking status: Never    Smokeless tobacco: Never   Vaping Use    Vaping status: Never Used   Substance Use Topics    Alcohol use: Not Currently     Comment: social occasional    Drug use: Never   [4]   Allergies  Allergen Reactions    Alendronate Other and Unknown     Muscle pain    Other reaction(s): Other (See Comments)   Muscle pain   Took it for 4 weeks. Muscle stiffness - back, thighs. Symptoms resolved after stopping it.    Amlodipine Swelling and Unknown     5 mg: Edema.     5 mg: Edema.    Annatto Hives and Itching    Cephalexin Anaphylaxis and Unknown     Throat closing as young adult.    Erythromycin Hives, Rash and Unknown     Can take z-divya.    Hydrochlorothiazide Itching and Unknown     Severe muscle cramps.    Lisinopril Itching and Unknown     Cough.    Losartan Hives and Itching     Losartan 25 mg & 50 mg: Hives.     Losartan 25 mg & 50 mg: Hives.    Tetracyclines Hives, Nausea Only and Unknown     Can take doxycycline.     Can take doxycycline.    Tizanidine Palpitations and Unknown     Dry mouth, Headache.     Dry mouth, Headache.    Alendronate Sodium Other     Took it for 4 weeks. Muscle stiffness - back, thighs. Symptoms resolved after stopping it.    Atorvastatin Other    Ciprofloxacin Nausea Only    House Dust Mite Unknown    Iodine Other      Topical Iodine: Rash.  BLISTERS    Topical Iodine: Rash.    Mold Unknown    Pork Extract Other    Pork/Porcine Containing Products Unknown    Simvastatin Other     Muscle aches    Sulfa (Sulfonamide Antibiotics) Other and Unknown    Latex Rash and Unknown    Moxifloxacin Nausea Only    Olopatadine Itching, Rash and Swelling

## 2025-05-22 ENCOUNTER — OFFICE VISIT (OUTPATIENT)
Dept: OTOLARYNGOLOGY | Facility: CLINIC | Age: 75
End: 2025-05-22
Payer: MEDICARE

## 2025-05-22 DIAGNOSIS — H72.92 PERFORATION OF LEFT TYMPANIC MEMBRANE: ICD-10-CM

## 2025-05-22 DIAGNOSIS — H90.A21 SENSORINEURAL HEARING LOSS (SNHL) OF RIGHT EAR WITH RESTRICTED HEARING OF LEFT EAR: ICD-10-CM

## 2025-05-22 DIAGNOSIS — H90.A32 MIXED CONDUCTIVE AND SENSORINEURAL HEARING LOSS OF LEFT EAR WITH RESTRICTED HEARING OF RIGHT EAR: ICD-10-CM

## 2025-05-22 DIAGNOSIS — H61.23 BILATERAL IMPACTED CERUMEN: Primary | ICD-10-CM

## 2025-05-22 DIAGNOSIS — J31.0 CHRONIC RHINITIS: ICD-10-CM

## 2025-05-22 RX ORDER — BENZONATATE 100 MG/1
100 CAPSULE ORAL
COMMUNITY
Start: 2025-04-28

## 2025-05-22 RX ORDER — PREDNISONE 20 MG/1
40 TABLET ORAL
COMMUNITY
Start: 2025-05-19 | End: 2025-05-24

## 2025-11-24 ENCOUNTER — APPOINTMENT (OUTPATIENT)
Dept: OTOLARYNGOLOGY | Facility: CLINIC | Age: 75
End: 2025-11-24
Payer: MEDICARE

## 2025-11-24 ENCOUNTER — APPOINTMENT (OUTPATIENT)
Dept: AUDIOLOGY | Facility: CLINIC | Age: 75
End: 2025-11-24
Payer: MEDICARE

## (undated) DEVICE — DRAPE COVER, C ARM, FLOUROSCAN IMAGING SYS

## (undated) DEVICE — GOWN, SURGICAL, SIRUS, NON REINFORCED, LARGE

## (undated) DEVICE — DRAPE, SHEET, VI, W/BETADINE

## (undated) DEVICE — NEEDLE, HYPODERMIC, MONOJECT, 22 G X 1.5 IN, BLUE, RIGID PACK

## (undated) DEVICE — NEEDLE, SPINAL, 22 G X 3.5 IN, BLACK HUB

## (undated) DEVICE — SOLUTION, IRRIGATION, X RX SODIUM CHL 0.9%, 1000ML BTL

## (undated) DEVICE — COVERS, DISPOSABLE, SMALL, FOR LIGHT PAD

## (undated) DEVICE — SYRINGE, 10 CC, LUER LOCK

## (undated) DEVICE — STRIP, SKIN CLOSURE, STERI STRIP, REINFORCED, 0.5 X 4 IN

## (undated) DEVICE — DRAPE, SHEET, U, W/ADHESIVE STRIP, IMPERVIOUS, 60 X 70 IN, DISPOSABLE, LF, STERILE

## (undated) DEVICE — SUTURE, VICRYL, RAPIDE, 4-0, 18 IN, P3, UNDYED

## (undated) DEVICE — GLOVE, SURGICAL, PROTEXIS PI , 8.0, PF, LF

## (undated) DEVICE — INTRACEPT, ADDITIONAL ACCESS INSTRUMENT

## (undated) DEVICE — DRAPE, C-ARM IMAGE

## (undated) DEVICE — Device

## (undated) DEVICE — INTRACEPT, RF PROBE

## (undated) DEVICE — SYRINGE, 20 CC, LUER LOCK

## (undated) DEVICE — DRAPE, SHEET, UTILITY, NON ABSORBENT, 18 X 26 IN, LF

## (undated) DEVICE — INTRACEPT, ACCESS INSTRUMENT 2VB, GEN3

## (undated) DEVICE — GLOVE, SURGICAL, PROTEXIS PI BLUE W/NEUTHERA, 8.0, PF, LF

## (undated) DEVICE — TISSUE ADHESIVE, EXOFIN, 1ML

## (undated) DEVICE — APPLICATOR, CHLORAPREP, W/ORANGE TINT, 26ML